# Patient Record
Sex: FEMALE | Race: WHITE | NOT HISPANIC OR LATINO | Employment: FULL TIME | URBAN - METROPOLITAN AREA
[De-identification: names, ages, dates, MRNs, and addresses within clinical notes are randomized per-mention and may not be internally consistent; named-entity substitution may affect disease eponyms.]

---

## 2017-10-23 ENCOUNTER — OFFICE VISIT (OUTPATIENT)
Dept: OBSTETRICS AND GYNECOLOGY | Facility: CLINIC | Age: 22
End: 2017-10-23
Payer: COMMERCIAL

## 2017-10-23 VITALS
SYSTOLIC BLOOD PRESSURE: 131 MMHG | WEIGHT: 123.44 LBS | BODY MASS INDEX: 22.58 KG/M2 | DIASTOLIC BLOOD PRESSURE: 87 MMHG

## 2017-10-23 DIAGNOSIS — Z12.4 ROUTINE CERVICAL SMEAR: ICD-10-CM

## 2017-10-23 DIAGNOSIS — Z11.3 SCREEN FOR STD (SEXUALLY TRANSMITTED DISEASE): ICD-10-CM

## 2017-10-23 DIAGNOSIS — Z01.419 WELL WOMAN EXAM WITH ROUTINE GYNECOLOGICAL EXAM: Primary | ICD-10-CM

## 2017-10-23 PROCEDURE — 87624 HPV HI-RISK TYP POOLED RSLT: CPT

## 2017-10-23 PROCEDURE — 88141 CYTOPATH C/V INTERPRET: CPT | Mod: ,,, | Performed by: PATHOLOGY

## 2017-10-23 PROCEDURE — 88175 CYTOPATH C/V AUTO FLUID REDO: CPT | Performed by: PATHOLOGY

## 2017-10-23 PROCEDURE — 87591 N.GONORRHOEAE DNA AMP PROB: CPT

## 2017-10-23 PROCEDURE — 99395 PREV VISIT EST AGE 18-39: CPT | Mod: S$GLB,,, | Performed by: OBSTETRICS & GYNECOLOGY

## 2017-10-23 PROCEDURE — 99999 PR PBB SHADOW E&M-EST. PATIENT-LVL III: CPT | Mod: PBBFAC,,, | Performed by: OBSTETRICS & GYNECOLOGY

## 2017-10-23 RX ORDER — NORGESTIMATE AND ETHINYL ESTRADIOL 0.25-0.035
1 KIT ORAL DAILY
Qty: 84 TABLET | Refills: 3 | Status: SHIPPED | OUTPATIENT
Start: 2017-10-23 | End: 2017-11-11 | Stop reason: SDUPTHER

## 2017-10-23 NOTE — PROGRESS NOTES
OBSTETRIC HISTORY:   OB History      Para Term  AB Living    0              SAB TAB Ectopic Multiple Live Births                      COMPREHENSIVE GYN HISTORY:  PAP History: Denies abnormal Paps. RECEIVED GARDASIL VACCINE  Infection History: Denies STDs. Denies PID.  Benign History: Denies uterine fibroids. Denies ovarian cysts. Denies endometriosis.   Cancer History: Denies cervical cancer. Denies uterine cancer or hyperplasia. Denies ovarian cancer. Denies vulvar cancer or pre-cancer. Denies vaginal cancer or pre-cancer. Denies breast cancer. Denies colon cancer.  Sexual Activity History:  reports that she currently engages in sexual activity. She reports using the following method of birth control/protection: OCP.   Menstrual History: Age of menarche: 13 years. Every 28 days, flows for 5 days. Moderate flow.  Dysmenorrhea History: Reports severe dysmenorrhea.       HPI:   22 y.o.  Patient's last menstrual period was 10/11/2017 (exact date).   Patient is  here for her annual gynecologic exam.  She has complaints of daily headaches and stomach cramping which started after generic Ortho Cyclen changed. Can print RX and try to find the actual Sprintec. Headache could be 2/2 borderline blood pressure so she should track that at home. Stomach cramps might be related to an inactive ingredient.. She denies bladder, breast and bowel complaints.    ROS:  GENERAL: Denies weight gain or weight loss. Feeling well overall.   SKIN: Denies rash or lesions.   HEAD: + headache.   NODES: Denies enlarged lymph nodes.   CHEST: Denies shortness of breath.   ABDOMEN: No abdominal pain (+ cramping) , constipation, diarrhea, nausea, vomiting or rectal bleeding.   URINARY: No frequency, dysuria, hematuria, or burning on urination.  REPRODUCTIVE: See HPI.   BREASTS: The patient denies pain, lumps, or nipple discharge.   HEMATOLOGIC: No easy bruisability.   MUSCULOSKELETAL: Denies joint pain or back pain.   NEUROLOGIC: Denies  weakness.   PSYCHIATRIC: Denies depression, anxiety or mood swings.    PE:   /87   Wt 56 kg (123 lb 7.3 oz)   LMP 10/11/2017 (Exact Date)   BMI 22.58 kg/m²   APPEARANCE: Well nourished, well developed, in no acute distress.  NECK: Neck symmetric without  thyromegaly.  NODES: No inguinal, cervical lymph node enlargement.  CHEST: Lungs clear to auscultation.  HEART: Regular rate and rhythm, no murmurs, rubs or gallops.  ABDOMEN: Soft. No tenderness or masses. No hernias.  BREASTS: Symmetrical, no skin changes or visible lesions. No palpable masses, nipple discharge or adenopathy bilaterally.  PELVIC:   VULVA: No lesions. Normal female genitalia.  URETHRAL MEATUS: Normal size and location, no lesions, no prolapse.  URETHRA: No masses, tenderness, prolapse or scarring.  VAGINA: Moist and well rugated, no discharge, no significant cystocele or rectocele.  CERVIX: No lesions and discharge.  UTERUS: Normal size, regular shape, mobile, non-tender, bladder base nontender.  ADNEXA: No masses or tenderness.    PROCEDURES:  Pap smear  GC/CT    Assessment:  Normal Gynecologic Exam  Headaches-- check BP at home  Cramping-- maybe GI related 2/2 inactive ingredient  Screen STD    Plan:  Mammogram and Colonoscopy if indicated by current recommendations.  Return to clinic in one year or for any problems or complaints.    Counseling:  Patient was counseled today on A.C.S. Pap guidelines and recommendations for yearly pelvic exams and monthly self breast exams; to see her PCP for other health maintenance. Regular exercise and healthy diet.

## 2017-10-24 LAB
C TRACH DNA SPEC QL NAA+PROBE: NOT DETECTED
N GONORRHOEA DNA SPEC QL NAA+PROBE: NOT DETECTED

## 2017-11-12 RX ORDER — NORGESTIMATE AND ETHINYL ESTRADIOL 0.25-0.035
KIT ORAL
Qty: 28 TABLET | Refills: 5 | Status: SHIPPED | OUTPATIENT
Start: 2017-11-12 | End: 2018-10-02 | Stop reason: SDUPTHER

## 2017-11-17 LAB
HPV16 AG SPEC QL: NEGATIVE
HPV16+18+H RISK 12 DNA CVX-IMP: POSITIVE
HPV18 DNA SPEC QL NAA+PROBE: NEGATIVE

## 2017-11-20 ENCOUNTER — TELEPHONE (OUTPATIENT)
Dept: OBSTETRICS AND GYNECOLOGY | Facility: CLINIC | Age: 22
End: 2017-11-20

## 2017-11-20 NOTE — TELEPHONE ENCOUNTER
Spoke with patient, Becky explained to patient what Ascus and HRHPV means. Patient verbalized understanding

## 2017-11-20 NOTE — TELEPHONE ENCOUNTER
LMTRC  Pap was ASCUS +HRHPV  It is recommned to repeat Cytology in one year even though she had the same pap last year per ASCCP guidelines

## 2017-11-20 NOTE — TELEPHONE ENCOUNTER
----- Message from Ashley Cosme sent at 11/20/2017 11:26 AM CST -----  Contact: Self [phone]  Patient Returning Your Phone Call

## 2018-10-01 ENCOUNTER — TELEPHONE (OUTPATIENT)
Dept: OBSTETRICS AND GYNECOLOGY | Facility: CLINIC | Age: 23
End: 2018-10-01

## 2018-10-01 NOTE — TELEPHONE ENCOUNTER
----- Message from Henny Carrillo sent at 10/1/2018  1:07 PM CDT -----  Contact: Self/ [phone]  Patient called in to get prescription refill on her birth control.     Please call and advise.     [id number] - [place], [place] - [address]

## 2018-10-01 NOTE — TELEPHONE ENCOUNTER
Patient is requesting a refill on OCP sent to her pharmacy. Patient has a future annual for 10/19/2018 at 10:45a. Please advise

## 2018-10-02 RX ORDER — NORGESTIMATE AND ETHINYL ESTRADIOL 0.25-0.035
1 KIT ORAL DAILY
Qty: 28 TABLET | Refills: 0 | Status: SHIPPED | OUTPATIENT
Start: 2018-10-02 | End: 2018-10-19 | Stop reason: SDUPTHER

## 2018-10-19 ENCOUNTER — OFFICE VISIT (OUTPATIENT)
Dept: OBSTETRICS AND GYNECOLOGY | Facility: CLINIC | Age: 23
End: 2018-10-19

## 2018-10-19 VITALS
BODY MASS INDEX: 22.07 KG/M2 | SYSTOLIC BLOOD PRESSURE: 120 MMHG | HEIGHT: 62 IN | WEIGHT: 119.94 LBS | DIASTOLIC BLOOD PRESSURE: 88 MMHG

## 2018-10-19 DIAGNOSIS — R87.810 ASCUS WITH POSITIVE HIGH RISK HPV CERVICAL: ICD-10-CM

## 2018-10-19 DIAGNOSIS — Z01.419 WELL WOMAN EXAM WITH ROUTINE GYNECOLOGICAL EXAM: Primary | ICD-10-CM

## 2018-10-19 DIAGNOSIS — Z11.3 SCREEN FOR STD (SEXUALLY TRANSMITTED DISEASE): ICD-10-CM

## 2018-10-19 DIAGNOSIS — Z12.4 ROUTINE CERVICAL SMEAR: ICD-10-CM

## 2018-10-19 DIAGNOSIS — Z00.00 LABORATORY EXAMINATION ORDERED AS PART OF A ROUTINE GENERAL MEDICAL EXAMINATION: ICD-10-CM

## 2018-10-19 DIAGNOSIS — R87.610 ASCUS WITH POSITIVE HIGH RISK HPV CERVICAL: ICD-10-CM

## 2018-10-19 PROCEDURE — 99999 PR PBB SHADOW E&M-EST. PATIENT-LVL III: CPT | Mod: PBBFAC,,, | Performed by: OBSTETRICS & GYNECOLOGY

## 2018-10-19 PROCEDURE — 99213 OFFICE O/P EST LOW 20 MIN: CPT | Mod: PBBFAC,PO | Performed by: OBSTETRICS & GYNECOLOGY

## 2018-10-19 PROCEDURE — 99395 PREV VISIT EST AGE 18-39: CPT | Mod: S$PBB,,, | Performed by: OBSTETRICS & GYNECOLOGY

## 2018-10-19 PROCEDURE — 88141 CYTOPATH C/V INTERPRET: CPT | Mod: ,,, | Performed by: PATHOLOGY

## 2018-10-19 PROCEDURE — 88175 CYTOPATH C/V AUTO FLUID REDO: CPT | Performed by: PATHOLOGY

## 2018-10-19 PROCEDURE — 87491 CHLMYD TRACH DNA AMP PROBE: CPT

## 2018-10-19 RX ORDER — NORGESTIMATE AND ETHINYL ESTRADIOL 0.25-0.035
1 KIT ORAL DAILY
Qty: 28 TABLET | Refills: 11 | Status: SHIPPED | OUTPATIENT
Start: 2018-10-19 | End: 2019-09-30 | Stop reason: SDUPTHER

## 2018-10-19 NOTE — PROGRESS NOTES
"OBSTETRIC HISTORY:   OB History      Para Term  AB Living    0              SAB TAB Ectopic Multiple Live Births                      COMPREHENSIVE GYN HISTORY:  PAP History: Reports abnormal Paps. Date:  Result: ASCUS +HRHPV Treatment: Repeat Pap  RECEIVED GARDASIL VACCINE  Infection History: Denies STDs. Denies PID.  Benign History: Denies uterine fibroids. Denies ovarian cysts. Denies endometriosis.   Cancer History: Denies cervical cancer. Denies uterine cancer or hyperplasia. Denies ovarian cancer. Denies vulvar cancer or pre-cancer. Denies vaginal cancer or pre-cancer. Denies breast cancer. Denies colon cancer.  Sexual Activity History:  reports that she currently engages in sexual activity. She reports using the following method of birth control/protection: OCP.   Menstrual History: Age of menarche: 13 years. Every 28 days, flows for 5 days. Moderate flow.  Dysmenorrhea History: Reports severe dysmenorrhea.         HPI:   23 y.o.  Patient's last menstrual period was 10/10/2018.   Patient is  here for her annual gynecologic exam.  She has complaints pain with sex. She denies bladder, breast and bowel complaints.    ROS:  GENERAL: Denies weight gain or weight loss. Feeling well overall.   SKIN: Denies rash or lesions.   HEAD: Denies headache.   NODES: Denies enlarged lymph nodes.   CHEST: Denies shortness of breath.   ABDOMEN: No abdominal pain, constipation, diarrhea, nausea, vomiting or rectal bleeding.   URINARY: No frequency, dysuria, hematuria, or burning on urination.  REPRODUCTIVE: See HPI.   BREASTS: The patient denies pain, lumps, or nipple discharge.   HEMATOLOGIC: No easy bruisability.   MUSCULOSKELETAL: Denies joint pain or back pain.   NEUROLOGIC: Denies weakness.   PSYCHIATRIC: Denies depression, anxiety or mood swings.    PE:   /88   Ht 5' 2" (1.575 m)   Wt 54.4 kg (119 lb 14.9 oz)   LMP 10/10/2018   BMI 21.94 kg/m²   APPEARANCE: Well nourished, well developed, in no " acute distress.  NECK: Neck symmetric without  thyromegaly.  NODES: No inguinal, cervical lymph node enlargement.  CHEST: Lungs clear to auscultation.  HEART: Regular rate and rhythm, no murmurs, rubs or gallops.  ABDOMEN: Soft. No tenderness or masses. No hernias. Asymmetric pelvic crests  BREASTS: Symmetrical, no skin changes or visible lesions. No palpable masses, nipple discharge or adenopathy bilaterally.  PELVIC:   VULVA: No lesions. Normal female genitalia.  URETHRAL MEATUS: Normal size and location, no lesions, no prolapse.  URETHRA: No masses, tenderness, prolapse or scarring.  VAGINA: Moist and well rugated, no discharge, no significant cystocele or rectocele.  CERVIX: No lesions and discharge.  UTERUS: Normal size, regular shape, mobile, non-tender, bladder base nontender.  ADNEXA: No masses or tenderness.+ pelvic floor tenderness    PROCEDURES:  Pap smear  GC/CT    Assessment:  Normal Gynecologic Exam  SI and PFD  Screen STD    Plan:  Mammogram and Colonoscopy if indicated by current recommendations.  Return to clinic in one year or for any problems or complaints.    Counseling:  Patient was counseled today on A.C.S. Pap guidelines and recommendations for yearly pelvic exams and monthly self breast exams; to see her PCP for other health maintenance. Regular exercise and healthy diet.   Patient counseled on SI joint and pelvic floor pain. Given article to read and recommend PT, massage therapy, Chiropractor, and/or inserts from [employer]. Increase exercises that help with the core. Taught Kegel relaxation technique.

## 2018-10-20 LAB
C TRACH DNA SPEC QL NAA+PROBE: NOT DETECTED
N GONORRHOEA DNA SPEC QL NAA+PROBE: NOT DETECTED

## 2018-10-22 ENCOUNTER — TELEPHONE (OUTPATIENT)
Dept: OBSTETRICS AND GYNECOLOGY | Facility: CLINIC | Age: 23
End: 2018-10-22

## 2019-02-14 ENCOUNTER — HOSPITAL ENCOUNTER (EMERGENCY)
Facility: HOSPITAL | Age: 24
Discharge: HOME | End: 2019-02-14
Attending: EMERGENCY MEDICINE

## 2019-02-14 VITALS
DIASTOLIC BLOOD PRESSURE: 68 MMHG | BODY MASS INDEX: 21.53 KG/M2 | TEMPERATURE: 99 F | HEART RATE: 54 BPM | WEIGHT: 117 LBS | OXYGEN SATURATION: 99 % | SYSTOLIC BLOOD PRESSURE: 103 MMHG | RESPIRATION RATE: 17 BRPM | HEIGHT: 62 IN

## 2019-02-14 DIAGNOSIS — R51.9 ACUTE NONINTRACTABLE HEADACHE, UNSPECIFIED HEADACHE TYPE: ICD-10-CM

## 2019-02-14 DIAGNOSIS — R10.84 GENERALIZED ABDOMINAL PAIN: ICD-10-CM

## 2019-02-14 DIAGNOSIS — R11.2 NON-INTRACTABLE VOMITING WITH NAUSEA, UNSPECIFIED VOMITING TYPE: ICD-10-CM

## 2019-02-14 DIAGNOSIS — R10.9 ABDOMINAL PAIN: Primary | ICD-10-CM

## 2019-02-14 DIAGNOSIS — M62.838 MUSCLE SPASMS OF NECK: ICD-10-CM

## 2019-02-14 LAB
ALBUMIN SERPL BCP-MCNC: 4.1 G/DL
ALP SERPL-CCNC: 51 U/L
ALT SERPL W/O P-5'-P-CCNC: 17 U/L
AMPHET+METHAMPHET UR QL: NEGATIVE
ANION GAP SERPL CALC-SCNC: 10 MMOL/L
AST SERPL-CCNC: 19 U/L
B-HCG UR QL: NEGATIVE
BARBITURATES UR QL SCN>200 NG/ML: NEGATIVE
BASOPHILS # BLD AUTO: 0.01 K/UL
BASOPHILS NFR BLD: 0.1 %
BENZODIAZ UR QL SCN>200 NG/ML: NEGATIVE
BILIRUB SERPL-MCNC: 0.7 MG/DL
BILIRUB UR QL STRIP: NEGATIVE
BUN SERPL-MCNC: 12 MG/DL
BZE UR QL SCN: NEGATIVE
CALCIUM SERPL-MCNC: 9.3 MG/DL
CANNABINOIDS UR QL SCN: NORMAL
CHLORIDE SERPL-SCNC: 105 MMOL/L
CK SERPL-CCNC: 71 U/L
CLARITY UR: CLEAR
CO2 SERPL-SCNC: 23 MMOL/L
COLOR UR: YELLOW
CREAT SERPL-MCNC: 0.8 MG/DL
CREAT UR-MCNC: 49.8 MG/DL
CTP QC/QA: YES
DIFFERENTIAL METHOD: ABNORMAL
EOSINOPHIL # BLD AUTO: 0.1 K/UL
EOSINOPHIL NFR BLD: 0.9 %
ERYTHROCYTE [DISTWIDTH] IN BLOOD BY AUTOMATED COUNT: 12.9 %
EST. GFR  (AFRICAN AMERICAN): >60 ML/MIN/1.73 M^2
EST. GFR  (NON AFRICAN AMERICAN): >60 ML/MIN/1.73 M^2
GLUCOSE SERPL-MCNC: 83 MG/DL
GLUCOSE UR QL STRIP: NEGATIVE
HCG INTACT+B SERPL-ACNC: <1.2 MIU/ML
HCT VFR BLD AUTO: 37.6 %
HGB BLD-MCNC: 12.2 G/DL
HGB UR QL STRIP: NEGATIVE
KETONES UR QL STRIP: NEGATIVE
LEUKOCYTE ESTERASE UR QL STRIP: NEGATIVE
LYMPHOCYTES # BLD AUTO: 1.6 K/UL
LYMPHOCYTES NFR BLD: 21.5 %
MCH RBC QN AUTO: 25.7 PG
MCHC RBC AUTO-ENTMCNC: 32.4 G/DL
MCV RBC AUTO: 79 FL
METHADONE UR QL SCN>300 NG/ML: NEGATIVE
MONOCYTES # BLD AUTO: 0.3 K/UL
MONOCYTES NFR BLD: 3.7 %
NEUTROPHILS # BLD AUTO: 5.4 K/UL
NEUTROPHILS NFR BLD: 73.7 %
NITRITE UR QL STRIP: NEGATIVE
OPIATES UR QL SCN: NEGATIVE
PCP UR QL SCN>25 NG/ML: NEGATIVE
PH UR STRIP: 7 [PH] (ref 5–8)
PLATELET # BLD AUTO: 216 K/UL
PMV BLD AUTO: 9.8 FL
POTASSIUM SERPL-SCNC: 3.5 MMOL/L
PROT SERPL-MCNC: 7.7 G/DL
PROT UR QL STRIP: NEGATIVE
RBC # BLD AUTO: 4.75 M/UL
SODIUM SERPL-SCNC: 138 MMOL/L
SP GR UR STRIP: 1.01 (ref 1–1.03)
TOXICOLOGY INFORMATION: NORMAL
URN SPEC COLLECT METH UR: NORMAL
UROBILINOGEN UR STRIP-ACNC: NEGATIVE EU/DL
WBC # BLD AUTO: 7.38 K/UL

## 2019-02-14 PROCEDURE — 81025 URINE PREGNANCY TEST: CPT | Performed by: EMERGENCY MEDICINE

## 2019-02-14 PROCEDURE — 81003 URINALYSIS AUTO W/O SCOPE: CPT

## 2019-02-14 PROCEDURE — 96361 HYDRATE IV INFUSION ADD-ON: CPT

## 2019-02-14 PROCEDURE — 25000003 PHARM REV CODE 250: Performed by: EMERGENCY MEDICINE

## 2019-02-14 PROCEDURE — 85025 COMPLETE CBC W/AUTO DIFF WBC: CPT

## 2019-02-14 PROCEDURE — 63600175 PHARM REV CODE 636 W HCPCS: Performed by: EMERGENCY MEDICINE

## 2019-02-14 PROCEDURE — 99284 EMERGENCY DEPT VISIT MOD MDM: CPT | Mod: 25

## 2019-02-14 PROCEDURE — 82550 ASSAY OF CK (CPK): CPT

## 2019-02-14 PROCEDURE — 80053 COMPREHEN METABOLIC PANEL: CPT

## 2019-02-14 PROCEDURE — 84702 CHORIONIC GONADOTROPIN TEST: CPT

## 2019-02-14 PROCEDURE — 96374 THER/PROPH/DIAG INJ IV PUSH: CPT

## 2019-02-14 PROCEDURE — 96375 TX/PRO/DX INJ NEW DRUG ADDON: CPT

## 2019-02-14 PROCEDURE — 80307 DRUG TEST PRSMV CHEM ANLYZR: CPT

## 2019-02-14 RX ORDER — CYCLOBENZAPRINE HCL 10 MG
10 TABLET ORAL EVERY 8 HOURS PRN
Qty: 14 TABLET | Refills: 0 | Status: SHIPPED | OUTPATIENT
Start: 2019-02-14 | End: 2019-02-19

## 2019-02-14 RX ORDER — ONDANSETRON 4 MG/1
4 TABLET, FILM COATED ORAL EVERY 6 HOURS PRN
Qty: 10 TABLET | Refills: 0 | Status: SHIPPED | OUTPATIENT
Start: 2019-02-14 | End: 2020-01-07 | Stop reason: CLARIF

## 2019-02-14 RX ORDER — DICYCLOMINE HYDROCHLORIDE 20 MG/1
20 TABLET ORAL 3 TIMES DAILY PRN
Qty: 14 TABLET | Refills: 0 | Status: SHIPPED | OUTPATIENT
Start: 2019-02-14 | End: 2019-03-16

## 2019-02-14 RX ORDER — METOCLOPRAMIDE HYDROCHLORIDE 5 MG/ML
10 INJECTION INTRAMUSCULAR; INTRAVENOUS
Status: COMPLETED | OUTPATIENT
Start: 2019-02-14 | End: 2019-02-14

## 2019-02-14 RX ORDER — KETOROLAC TROMETHAMINE 30 MG/ML
15 INJECTION, SOLUTION INTRAMUSCULAR; INTRAVENOUS
Status: COMPLETED | OUTPATIENT
Start: 2019-02-14 | End: 2019-02-14

## 2019-02-14 RX ORDER — BUTALBITAL, ACETAMINOPHEN AND CAFFEINE 50; 325; 40 MG/1; MG/1; MG/1
1 TABLET ORAL EVERY 6 HOURS PRN
Qty: 14 TABLET | Refills: 0 | Status: SHIPPED | OUTPATIENT
Start: 2019-02-14 | End: 2019-03-16

## 2019-02-14 RX ADMIN — KETOROLAC TROMETHAMINE 15 MG: 30 INJECTION, SOLUTION INTRAMUSCULAR at 03:02

## 2019-02-14 RX ADMIN — METOCLOPRAMIDE 10 MG: 5 INJECTION, SOLUTION INTRAMUSCULAR; INTRAVENOUS at 03:02

## 2019-02-14 RX ADMIN — LIDOCAINE HYDROCHLORIDE: 20 SOLUTION ORAL; TOPICAL at 03:02

## 2019-02-14 RX ADMIN — SODIUM CHLORIDE 1000 ML: 0.9 INJECTION, SOLUTION INTRAVENOUS at 01:02

## 2019-02-14 NOTE — ED NOTES
Pt with multiple complaints including N/V, fatigue intermittently for the past month, Missed last menstral cycle, had negative pregnancy tests at home, Stiff neck x 2 days and headache rated 2/10.

## 2019-02-14 NOTE — ED PROVIDER NOTES
Encounter Date: 2/14/2019       History     Chief Complaint   Patient presents with    Emesis     N/V x1 month, fatigue, reports -UPT at home, missed period. Also complaining of R neck pain and swelling with headache but has improved.      24-year-old female presents emergency department complaining of nausea, vomiting. Onset a few weeks ago.  States initially she had some nausea, vomiting, diarrhea, with a few episodes of nonbloody, nonbilious emesis per day as well as 1 or 2 loose nonbloody bowel movements.  She had some abdominal cramping.  She thought she had a stomach bug.  Though symptoms mostly resolved, except for the nausea.  States she has been having persistent nausea with intermittent emesis, sometimes none and sometimes 2 or 3 episodes per day.  The symptoms have been persistent over the past few weeks.  She also states he she missed her last menstrual cycle, although she reports she has had 2 or 3-urine pregnancy tests.  She also reports some breast tenderness bilaterally and general fatigue. She also complains of some right lateral neck pain and swelling.  Reports it was worse last night and is improved somewhat, but she still has a spasm and aching like pain to her neck.  Last night also caused her to have a headache, which has since resolved.  Pain is worse with certain movements and positions and without alleviating factors.  No recent injury. Denies any focal numbness or weakness. No other symptoms reported at this time.          Review of patient's allergies indicates:  No Known Allergies  Past Medical History:   Diagnosis Date    Asthma     Seizures      History reviewed. No pertinent surgical history.  Family History   Problem Relation Age of Onset    Breast cancer Paternal Grandmother 75    Heart disease Maternal Grandfather     Colon cancer Neg Hx     Ovarian cancer Neg Hx      Social History     Tobacco Use    Smoking status: Former Smoker    Smokeless tobacco: Never Used    Substance Use Topics    Alcohol use: Yes     Comment: occasional     Drug use: No     Review of Systems   Constitutional: Positive for fatigue. Negative for chills and fever.   HENT: Negative for congestion, sore throat and voice change.    Eyes: Negative for photophobia, pain and redness.   Respiratory: Negative for cough, choking and shortness of breath.    Cardiovascular: Negative for chest pain, palpitations and leg swelling.   Gastrointestinal: Positive for abdominal pain, nausea and vomiting.   Genitourinary: Negative for dysuria, frequency and urgency.   Musculoskeletal: Positive for neck pain. Negative for back pain and neck stiffness.   Neurological: Positive for headaches. Negative for seizures, syncope, speech difficulty, light-headedness and numbness.   All other systems reviewed and are negative.      Physical Exam     Initial Vitals [02/14/19 1131]   BP Pulse Resp Temp SpO2   (!) 141/103 98 17 98.6 °F (37 °C) 98 %      MAP       --         Physical Exam    Nursing note and vitals reviewed.  Constitutional: She appears well-developed and well-nourished. No distress.   HENT:   Head: Normocephalic and atraumatic.   Oropharynx clear; Dry MM   Eyes: Conjunctivae and EOM are normal. Pupils are equal, round, and reactive to light.   Neck: Normal range of motion. Neck supple. No tracheal deviation present.       Cardiovascular: Normal rate, regular rhythm, normal heart sounds and intact distal pulses.   Pulmonary/Chest: Breath sounds normal. No respiratory distress. She has no wheezes. She has no rhonchi. She has no rales.   Abdominal: Soft. Bowel sounds are normal. She exhibits no distension. There is no tenderness.   Musculoskeletal: Normal range of motion. She exhibits no edema or tenderness.   Neurological: She is alert and oriented to person, place, and time. She has normal strength. No cranial nerve deficit or sensory deficit. GCS score is 15. GCS eye subscore is 4. GCS verbal subscore is 5. GCS  motor subscore is 6.   Skin: Skin is warm and dry. Capillary refill takes less than 2 seconds.         ED Course   Procedures  Labs Reviewed   URINALYSIS   DRUG SCREEN PANEL, URINE EMERGENCY   CBC W/ AUTO DIFFERENTIAL   COMPREHENSIVE METABOLIC PANEL   CK   HCG, QUANTITATIVE, PREGNANCY   POCT URINE PREGNANCY            X-Rays:   Independently Interpreted Readings:   Other Readings:  Imaging interpreted by radiologist and visualized by me:    Imaging Results          X-Ray Abdomen Flat And Erect (Final result)  Result time 02/14/19 14:47:04    Final result by Skyler Servin MD (02/14/19 14:47:04)                 Impression:      No acute process.      Electronically signed by: Skyler Servin MD  Date:    02/14/2019  Time:    14:47             Narrative:    EXAMINATION:  XR ABDOMEN FLAT AND ERECT    CLINICAL HISTORY:  Unspecified abdominal pain    TECHNIQUE:  Flat and erect AP views of the abdomen were performed.    COMPARISON:  None    FINDINGS:  No dilated loops of small bowel.  Mild amount of gas within the colon.  No free air under the diaphragm.  Lung bases are clear.  Osseous structures are within normal limits.                                Medical Decision Making:   Initial Assessment:   24-year-old female presentsEmergency department complaining of nausea, vomiting, diarrhea, abdominal pain, missed last menstrual cycle, muscle spasm, headache  Differential Diagnosis:   ICH, SAH, CVA, tension versus cluster versus migraine headache, muscle spasm, fracture, dislocation, contusion, sprain, strain, dehydration, electrolyte dyscrasias, diverticulitis, cholecystitis, pancreatitis, appendicitis, obstruction, constipation, UTI, pyelonephritis, kidney stone, pregnancy, miscarriage, ectopic.  Independently Interpreted Test(s):   I have ordered and independently interpreted X-rays - see prior notes.  Clinical Tests:   Lab Tests: Reviewed       <> Summary of Lab: Benign  ED Management:  Labs and imaging benign.   Patient given IV fluid, Reglan, Toradol, GI cocktail.  She is tolerating p.o. and reports almost total resolution of all of her symptoms.  Vital signs remained stable.  Informed of results as well as plan to discharge with prescriptions for Zofran, Bentyl, Fioricet, Flexeril, instructions to follow up with both her primary care and OB gyn physicians, reasons to return to the emergency room.  Patient expressed good understanding and is comfortable with discharge at this time.                   ED Course as of Feb 14 1346   Thu Feb 14, 2019   1144 This is a SORT/MSE of a 24 y.o. female presenting to the ED with c/o N/V x 1 month. Patient reports that she missed her period but reports negative UPT at home. Care will be transferred to an alternate provider when patient is roomed for a full evaluation and final disposition. KAREN BrushP-BC 11:44 AM   [CH]      ED Course User Index  [CH] John Lovell NP     Clinical Impression:   The primary encounter diagnosis was Abdominal pain. Diagnoses of Non-intractable vomiting with nausea, unspecified vomiting type, Acute nonintractable headache, unspecified headache type, Muscle spasms of neck, and Generalized abdominal pain were also pertinent to this visit.      Disposition:   Disposition: Discharged  Condition: Stable                        Owen House MD  02/14/19 1605       Owen House MD  03/01/19 7420

## 2019-09-30 RX ORDER — NORGESTIMATE AND ETHINYL ESTRADIOL 0.25-0.035
1 KIT ORAL DAILY
Qty: 28 TABLET | Refills: 0 | Status: SHIPPED | OUTPATIENT
Start: 2019-09-30 | End: 2019-10-17 | Stop reason: SDUPTHER

## 2019-09-30 NOTE — TELEPHONE ENCOUNTER
Pap negative at last well woman exam on 10/19/2018.  Annual scheduled 10/17/2019.  Patient notified one pack will be sent to the [facility]. Once seen on 10/17/19 a new prescription for the year will be sent.  Patient verbalized understanding

## 2019-09-30 NOTE — TELEPHONE ENCOUNTER
----- Message from Courtney Jarrett sent at 9/30/2019  1:04 PM CDT -----  Contact: self  [phone]  Pt is calling to request a refill for norgestimate-ethinyl estradiol (SPRINTEC, 28, ORAL)      [facility] [address] phone [phone]      Thank you!

## 2019-10-17 ENCOUNTER — OFFICE VISIT (OUTPATIENT)
Dept: OBSTETRICS AND GYNECOLOGY | Facility: CLINIC | Age: 24
End: 2019-10-17
Payer: MEDICAID

## 2019-10-17 VITALS
BODY MASS INDEX: 20.59 KG/M2 | DIASTOLIC BLOOD PRESSURE: 80 MMHG | HEIGHT: 62 IN | SYSTOLIC BLOOD PRESSURE: 124 MMHG | WEIGHT: 111.88 LBS

## 2019-10-17 DIAGNOSIS — Z01.419 WELL WOMAN EXAM WITH ROUTINE GYNECOLOGICAL EXAM: Primary | ICD-10-CM

## 2019-10-17 DIAGNOSIS — Z12.4 ROUTINE CERVICAL SMEAR: ICD-10-CM

## 2019-10-17 PROCEDURE — 99213 OFFICE O/P EST LOW 20 MIN: CPT | Mod: PBBFAC,PO | Performed by: OBSTETRICS & GYNECOLOGY

## 2019-10-17 PROCEDURE — 99395 PR PREVENTIVE VISIT,EST,18-39: ICD-10-PCS | Mod: S$PBB,,, | Performed by: OBSTETRICS & GYNECOLOGY

## 2019-10-17 PROCEDURE — 99395 PREV VISIT EST AGE 18-39: CPT | Mod: S$PBB,,, | Performed by: OBSTETRICS & GYNECOLOGY

## 2019-10-17 PROCEDURE — 99999 PR PBB SHADOW E&M-EST. PATIENT-LVL III: CPT | Mod: PBBFAC,,, | Performed by: OBSTETRICS & GYNECOLOGY

## 2019-10-17 PROCEDURE — 88175 CYTOPATH C/V AUTO FLUID REDO: CPT

## 2019-10-17 PROCEDURE — 87491 CHLMYD TRACH DNA AMP PROBE: CPT

## 2019-10-17 PROCEDURE — 99999 PR PBB SHADOW E&M-EST. PATIENT-LVL III: ICD-10-PCS | Mod: PBBFAC,,, | Performed by: OBSTETRICS & GYNECOLOGY

## 2019-10-17 RX ORDER — NORGESTIMATE AND ETHINYL ESTRADIOL 0.25-0.035
1 KIT ORAL DAILY
Qty: 28 TABLET | Refills: 11 | Status: SHIPPED | OUTPATIENT
Start: 2019-10-17 | End: 2020-10-16 | Stop reason: SDUPTHER

## 2019-10-17 NOTE — PROGRESS NOTES
"OBSTETRIC HISTORY:   OB History        0    Para        Term                AB        Living           SAB        TAB        Ectopic        Multiple        Live Births                    COMPREHENSIVE GYN HISTORY:  PAP History: Reports abnormal Paps. Date:  Result: ASCUS +HRHPV Treatment: Repeat Pap  RECEIVED GARDASIL VACCINE  Infection History: Denies STDs. Denies PID.  Benign History: Denies uterine fibroids. Denies ovarian cysts. Denies endometriosis.   Cancer History: Denies cervical cancer. Denies uterine cancer or hyperplasia. Denies ovarian cancer. Denies vulvar cancer or pre-cancer. Denies vaginal cancer or pre-cancer. Denies breast cancer. Denies colon cancer.  Sexual Activity History:  reports that she currently engages in sexual activity. She reports using the following method of birth control/protection: OCP.   Menstrual History: Age of menarche: 13 years. Every 28 days, flows for 5 days. Moderate flow.  Dysmenorrhea History: Reports severe dysmenorrhea.         HPI:   24 y.o.  Patient's last menstrual period was 10/08/2019.   Patient is  here for her annual gynecologic exam.  She has no complaints. She denies bladder, breast and bowel complaints.    ROS:  GENERAL: Denies weight gain or weight loss. Feeling well overall.   SKIN: Denies rash or lesions.   HEAD: Denies headache.   NODES: Denies enlarged lymph nodes.   CHEST: Denies shortness of breath.   ABDOMEN: No abdominal pain, constipation, diarrhea, nausea, vomiting or rectal bleeding.   URINARY: No frequency, dysuria, hematuria, or burning on urination.  REPRODUCTIVE: See HPI.   BREASTS: The patient denies pain, lumps, or nipple discharge.   HEMATOLOGIC: No easy bruisability.   MUSCULOSKELETAL: Denies joint pain or back pain.   NEUROLOGIC: Denies weakness.   PSYCHIATRIC: Denies depression, anxiety or mood swings.    PE:   /80   Ht 5' 2" (1.575 m)   Wt 50.8 kg (111 lb 14.1 oz)   LMP 10/08/2019   BMI 20.46 kg/m² "   APPEARANCE: Well nourished, well developed, in no acute distress.  NECK: Neck symmetric without  thyromegaly.  NODES: No inguinal, cervical lymph node enlargement.  CHEST: Lungs clear to auscultation.  HEART: Regular rate and rhythm, no murmurs, rubs or gallops.  ABDOMEN: Soft. No tenderness or masses. No hernias.  BREASTS: Symmetrical, no skin changes or visible lesions. No palpable masses, nipple discharge or adenopathy bilaterally.  PELVIC:   VULVA: No lesions. Normal female genitalia.  URETHRAL MEATUS: Normal size and location, no lesions, no prolapse.  URETHRA: No masses, tenderness, prolapse or scarring.  VAGINA: Moist and well rugated, no discharge, no significant cystocele or rectocele.  CERVIX: No lesions and discharge.  UTERUS: Normal size, regular shape, mobile, non-tender, bladder base nontender.  ADNEXA: No masses or tenderness.    PROCEDURES:  Pap smear  GC/CT    Assessment:  Normal Gynecologic Exam    Plan:  Mammogram and Colonoscopy if indicated by current recommendations.  Return to clinic in one year or for any problems or complaints.    Counseling:  Patient was counseled today on A.C.S. Pap guidelines and recommendations for yearly pelvic exams and monthly self breast exams; to see her PCP for other health maintenance. Regular exercise and healthy diet.

## 2019-10-18 LAB
C TRACH DNA SPEC QL NAA+PROBE: NOT DETECTED
N GONORRHOEA DNA SPEC QL NAA+PROBE: NOT DETECTED

## 2020-01-07 ENCOUNTER — HOSPITAL ENCOUNTER (EMERGENCY)
Facility: HOSPITAL | Age: 25
Discharge: HOME | End: 2020-01-07
Attending: EMERGENCY MEDICINE
Payer: MEDICAID

## 2020-01-07 VITALS
SYSTOLIC BLOOD PRESSURE: 126 MMHG | BODY MASS INDEX: 21.16 KG/M2 | WEIGHT: 115 LBS | DIASTOLIC BLOOD PRESSURE: 82 MMHG | HEIGHT: 62 IN | HEART RATE: 65 BPM | TEMPERATURE: 99 F | RESPIRATION RATE: 18 BRPM | OXYGEN SATURATION: 100 %
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
## 2020-01-07 DIAGNOSIS — S93.402A MODERATE LEFT ANKLE SPRAIN, INITIAL ENCOUNTER: Primary | ICD-10-CM

## 2020-01-07 LAB
B-HCG UR QL: NEGATIVE
CTP QC/QA: YES

## 2020-01-07 PROCEDURE — 25000003 PHARM REV CODE 250: Performed by: NURSE PRACTITIONER

## 2020-01-07 PROCEDURE — 99284 EMERGENCY DEPT VISIT MOD MDM: CPT | Mod: 25

## 2020-01-07 PROCEDURE — 81025 URINE PREGNANCY TEST: CPT | Performed by: EMERGENCY MEDICINE

## 2020-01-07 RX ORDER — IBUPROFEN 600 MG/1
600 TABLET ORAL EVERY 6 HOURS PRN
Qty: 20 TABLET | Refills: 0 | Status: SHIPPED | OUTPATIENT
Start: 2020-01-07 | End: 2020-01-12

## 2020-01-07 RX ORDER — IBUPROFEN 600 MG/1
600 TABLET ORAL
Status: COMPLETED | OUTPATIENT
Start: 2020-01-07 | End: 2020-01-07

## 2020-01-07 RX ORDER — RIZATRIPTAN BENZOATE 10 MG/1
10 TABLET ORAL
COMMUNITY
End: 2024-02-19

## 2020-01-07 RX ORDER — BUSPIRONE HYDROCHLORIDE 10 MG/1
10 TABLET ORAL 3 TIMES DAILY
COMMUNITY
End: 2020-12-02

## 2020-01-07 RX ORDER — FLUTICASONE PROPIONATE 50 MCG
1 SPRAY, SUSPENSION (ML) NASAL DAILY
COMMUNITY

## 2020-01-07 RX ORDER — METHOCARBAMOL 750 MG/1
750 TABLET, FILM COATED ORAL 3 TIMES DAILY PRN
Qty: 15 TABLET | Refills: 0 | Status: SHIPPED | OUTPATIENT
Start: 2020-01-07 | End: 2020-01-12

## 2020-01-07 RX ORDER — AMITRIPTYLINE HYDROCHLORIDE 10 MG/1
25 TABLET, FILM COATED ORAL NIGHTLY PRN
COMMUNITY
End: 2020-07-09

## 2020-01-07 RX ADMIN — IBUPROFEN 600 MG: 600 TABLET, FILM COATED ORAL at 10:01

## 2020-01-07 NOTE — ED PROVIDER NOTES
Encounter Date: 1/7/2020       History     Chief Complaint   Patient presents with    Ankle Pain     right ankle pain after a fall 1 day ago     Pt is a 23 yo female with medical history of anxiety, seizures, asthma and migraines presenting to the ED for left ankle and foot pain after a trip and fall last night.  Pt states fall occurred around 9pm last night.  Pt denies taking anything for pain.  Pt states she tried ice and elevation for swelling.  Pt states pain worse this morning.  Pt states she is unable to apply pressure since fall.  Pt denies any decreased sensation, numbness or tingling of foot.      The history is provided by the patient and a parent.     Review of patient's allergies indicates:  No Known Allergies  Past Medical History:   Diagnosis Date    Anxiety     Asthma     Migraine     Seizures      No past surgical history on file.  Family History   Problem Relation Age of Onset    Breast cancer Paternal Grandmother 75    Heart disease Maternal Grandfather     Colon cancer Neg Hx     Ovarian cancer Neg Hx      Social History     Tobacco Use    Smoking status: Former Smoker    Smokeless tobacco: Never Used   Substance Use Topics    Alcohol use: Yes     Comment: occasional     Drug use: No     Review of Systems   Constitutional: Positive for activity change.   Respiratory: Negative for shortness of breath.    Cardiovascular: Negative for chest pain.   Gastrointestinal: Negative for nausea.   Genitourinary: Negative for dysuria.   Musculoskeletal: Positive for arthralgias ( left ankle/foot), gait problem ( due to pain) and joint swelling ( left ankle). Negative for back pain.   Skin: Positive for color change ( left ankle). Negative for rash and wound.   Neurological: Negative for dizziness, seizures, weakness and numbness.   Hematological: Does not bruise/bleed easily.   All other systems reviewed and are negative.      Physical Exam     Initial Vitals [01/07/20 0952]   BP Pulse Resp Temp  SpO2   (!) 156/78 95 18 98.6 °F (37 °C) 100 %      MAP       --         Physical Exam    Nursing note and vitals reviewed.  Constitutional: Vital signs are normal. She appears well-developed and well-nourished. She is cooperative. No distress.   HENT:   Head: Normocephalic and atraumatic.   Mouth/Throat: Uvula is midline, oropharynx is clear and moist and mucous membranes are normal.   Eyes: Conjunctivae, EOM and lids are normal. Pupils are equal, round, and reactive to light.   Neck: Trachea normal, normal range of motion and phonation normal. Neck supple.   Cardiovascular: Normal rate, regular rhythm and intact distal pulses.   Pulses:       Radial pulses are 2+ on the right side, and 2+ on the left side.        Dorsalis pedis pulses are 2+ on the right side, and 2+ on the left side.   Pulmonary/Chest: Effort normal and breath sounds normal.   Abdominal: Normal appearance.   Musculoskeletal:        Left ankle: She exhibits decreased range of motion, swelling and ecchymosis. She exhibits no deformity, no laceration and normal pulse. Tenderness.        Left foot: There is decreased range of motion, tenderness, bony tenderness and swelling. There is normal capillary refill, no crepitus, no deformity and no laceration.   Neurological: She is alert and oriented to person, place, and time. She has normal strength. No sensory deficit. GCS eye subscore is 4. GCS verbal subscore is 5. GCS motor subscore is 6.   Skin: Skin is warm, dry and intact. Capillary refill takes less than 2 seconds. Bruising and ecchymosis ( left ankle) noted. No pallor.         ED Course   Procedures  Labs Reviewed   POCT URINE PREGNANCY          Imaging Results          X-Ray Foot Complete Left (Final result)  Result time 01/07/20 11:47:18    Final result by Alexander Chaudhary MD (01/07/20 11:47:18)                 Impression:      As above.      Electronically signed by: Alexander Chaudhary  Date:    01/07/2020  Time:    11:47              Narrative:    EXAMINATION:  XR FOOT COMPLETE 3 VIEW LEFT    CLINICAL HISTORY:  .  Unspecified fall, initial encounter    TECHNIQUE:  AP, lateral and oblique views of the left foot were performed.    COMPARISON:  None available    FINDINGS:  No acute fracture or dislocation.  No appreciable Lisfranc interval widening allowing for technique.  No radiopaque foreign body.                               X-Ray Ankle Complete Left (Final result)  Result time 01/07/20 11:46:18    Final result by Alexander Chaudhary MD (01/07/20 11:46:18)                 Impression:      As above.      Electronically signed by: Alexander Chaudhary  Date:    01/07/2020  Time:    11:46             Narrative:    EXAMINATION:  XR ANKLE COMPLETE 3 VIEW LEFT    CLINICAL HISTORY:  Unspecified fall, initial encounter    TECHNIQUE:  AP, lateral and oblique views of the left ankle were performed.    COMPARISON:  None available    FINDINGS:  No acute fracture or dislocation.  Ankle mortise is symmetric.  No radiopaque foreign body.                              X-Rays:   Independently Interpreted Readings:   Other Readings:  Left foot and ankle x-ray showed negative for any acute fractures or dislocations.  No Lisfranc interval widening noted. Ankle mortise symmetric.     Medical Decision Making:   Initial Assessment:   Emergent evaluation of a 25 yo female patient presenting to the ER with chief complaint of left ankle and foot pain after a trip and fall last night.  Patient states she tried ice and elevation but pain was worse this morning.  Patient states she is unable ambulate due to pain. On exam patient is A&O x3.  Cap refill less than 3 sec.  Tenderness to palpation to left dorsal aspect of ankle.  Strength 4/5 due to pain. Good sensation noted.  Plus two DP noted. Ecchymosis noted to dorsal ankle and foot.  No calf or shin pain to palpation noted.  Differential Diagnosis:   Differential diagnoses include but are not limited to sprain, strain,  fracture, contusion, dislocation, others.      Independently Interpreted Test(s):   I have ordered and independently interpreted X-rays - see prior notes.  Clinical Tests:   Lab Tests: Ordered and Reviewed  The following lab test(s) were unremarkable: UPT       <> Summary of Lab: Urine pregnancy negative.   Radiological Study: Ordered and Reviewed  ED Management:  I will get urine, images, medicate and reassess.      Patient reassessed after medications.  Patient states feeling slightly better.  Patient and mother updated on imaging results.  Advised we will plan Ace wrap for comfort.  Air cast for comfort.  Patient advised to take ibuprofen and Robaxin as prescribed.  Continue with movement and stretches starting tomorrow to promote healing.  Work note given.  Patient advised to follow up with PCP or Orthopedics as needed.  Patient and mother verbalized understanding of this plan of care.  All questions and concerns addressed.    Patient is hemodynamically stable, vital signs are normal. Discharge instructions given. Return to ED precautions discussed. Follow up as directed. Pt verbalized understanding of this plan.  Pt is stable for discharge.                                  Clinical Impression:       ICD-10-CM ICD-9-CM   1. Moderate left ankle sprain, initial encounter S93.402A 845.00   2. Fall W19.XXXA E888.9         Disposition:   Disposition: Discharged  Condition: Stable                     Janett Zhao NP  01/07/20 [id number]

## 2020-01-07 NOTE — ED NOTES
Pt reports was walking yesterday in kitchen and fell on left foot, twisting ankle.  Denies LOC.  P/S/M intact to left toes, nailbeds pink/cap refill < 2 sec, pedal pulses intact bilaterally.  No obvious swelling or bruising.  Pain increases with weight bearing.  Ice pack applied, foot elevated on pillow for comfort.    APPEARANCE: Alert, oriented and in no acute distress.  CARDIAC: Normal rate and rhythm.   PERIPHERAL VASCULAR: peripheral pulses present. Normal cap refill. No edema. Warm to touch.    RESPIRATORY:Normal rate and effort, breath sounds clear bilaterally throughout chest. Respirations are equal and unlabored no obvious signs of distress.  GASTRO: softl, no tenderness, no abdominal distention.  SKIN: Skin is warm and dry, normal skin turgor, mucous membranes moist.  MENTAL STATUS: awake, alert and aware of environment.  EYE: PERRL, both eyes: pupils brisk and reactive to light. Normal size.  ENT: EARS: no obvious drainage. NOSE: no active bleeding.

## 2020-01-07 NOTE — DISCHARGE INSTRUCTIONS
Your imaging today showed no fractures or dislocations.  We have prescribed you ibuprofen for pain and swelling. I have also prescribed you Robaxin for muscle strain.  We have applied an Ace wrap for compression and an air cast for comfort.  Ice or heat for comfort.  Continue with stretches and movement starting tomorrow.  Follow up with your PCP or Orthopedics as needed.    Our goal in the emergency department is to always give you outstanding care and exceptional service. You may receive a survey by mail or e-mail in the next week regarding your experience in our ED. We would greatly appreciate your completing and returning the survey. Your feedback provides us with a way to recognize our staff who give very good care and it helps us learn how to improve when your experience was below our aspiration of excellence.

## 2020-07-09 ENCOUNTER — OFFICE VISIT (OUTPATIENT)
Dept: OBSTETRICS AND GYNECOLOGY | Facility: CLINIC | Age: 25
End: 2020-07-09
Payer: MEDICAID

## 2020-07-09 VITALS
SYSTOLIC BLOOD PRESSURE: 120 MMHG | WEIGHT: 138.5 LBS | DIASTOLIC BLOOD PRESSURE: 70 MMHG | BODY MASS INDEX: 25.49 KG/M2 | HEIGHT: 62 IN

## 2020-07-09 DIAGNOSIS — N94.9 ADNEXAL FULLNESS: ICD-10-CM

## 2020-07-09 DIAGNOSIS — N94.10 DYSPAREUNIA IN FEMALE: ICD-10-CM

## 2020-07-09 DIAGNOSIS — N91.5 OLIGOMENORRHEA, UNSPECIFIED TYPE: Primary | ICD-10-CM

## 2020-07-09 DIAGNOSIS — R14.0 BLOATING: ICD-10-CM

## 2020-07-09 LAB
B-HCG UR QL: NEGATIVE
CTP QC/QA: YES

## 2020-07-09 PROCEDURE — 99213 PR OFFICE/OUTPT VISIT, EST, LEVL III, 20-29 MIN: ICD-10-PCS | Mod: S$PBB,,, | Performed by: OBSTETRICS & GYNECOLOGY

## 2020-07-09 PROCEDURE — 99213 OFFICE O/P EST LOW 20 MIN: CPT | Mod: S$PBB,,, | Performed by: OBSTETRICS & GYNECOLOGY

## 2020-07-09 PROCEDURE — 99999 PR PBB SHADOW E&M-EST. PATIENT-LVL III: CPT | Mod: PBBFAC,,, | Performed by: OBSTETRICS & GYNECOLOGY

## 2020-07-09 PROCEDURE — 99999 PR PBB SHADOW E&M-EST. PATIENT-LVL III: ICD-10-PCS | Mod: PBBFAC,,, | Performed by: OBSTETRICS & GYNECOLOGY

## 2020-07-09 PROCEDURE — 99213 OFFICE O/P EST LOW 20 MIN: CPT | Mod: PBBFAC,PO | Performed by: OBSTETRICS & GYNECOLOGY

## 2020-07-09 RX ORDER — AMITRIPTYLINE HYDROCHLORIDE 25 MG/1
1 TABLET, FILM COATED ORAL
COMMUNITY
Start: 2020-06-28 | End: 2024-02-19

## 2020-07-09 RX ORDER — HYDROXYZINE HYDROCHLORIDE 25 MG/1
1 TABLET, FILM COATED ORAL
COMMUNITY
Start: 2020-06-20 | End: 2024-02-19

## 2020-07-09 RX ORDER — CITALOPRAM 20 MG/1
1 TABLET, FILM COATED ORAL
COMMUNITY
Start: 2020-07-06 | End: 2024-02-19

## 2020-07-09 NOTE — PROGRESS NOTES
OBSTETRIC HISTORY:   OB History        0    Para        Term                AB        Living           SAB        TAB        Ectopic        Multiple        Live Births                    COMPREHENSIVE GYN HISTORY:  PAP History: Reports abnormal Paps. Date:  Result: ASCUS +HRHPV Treatment: Repeat Pap  RECEIVED GARDASIL VACCINE  Infection History: Denies STDs. Denies PID.  Benign History: Denies uterine fibroids. Denies ovarian cysts. Denies endometriosis.   Cancer History: Denies cervical cancer. Denies uterine cancer or hyperplasia. Denies ovarian cancer. Denies vulvar cancer or pre-cancer. Denies vaginal cancer or pre-cancer. Denies breast cancer. Denies colon cancer.  Sexual Activity History:  reports that she currently engages in sexual activity. She reports using the following method of birth control/protection: OCP.   Menstrual History: Age of menarche: 13 years. Every 28 days, flows for 5 days. Moderate flow.  Dysmenorrhea History: Reports severe dysmenorrhea.      HPI:   25 y.o.  Patient's last menstrual period was 2020 (approximate).   Patient is  here complaining of abnormal periods, painful sex, and bloating. Period has become very light and spotty with mostly old blood. Still on Ortho Cyclen but generic brands have been changed. 20 pound weight gain. She denies bladder, breast complaints.    ROS:  GENERAL: Denies weight gain or weight loss. Feeling well overall.   SKIN: Denies rash or lesions.   HEAD: Denies headache.   NODES: Denies enlarged lymph nodes.   CHEST: Denies shortness of breath.   ABDOMEN: No abdominal pain, constipation, diarrhea, nausea, vomiting or rectal bleeding.   URINARY: No frequency, dysuria, hematuria, or burning on urination.  REPRODUCTIVE: See HPI.   BREASTS: The patient denies pain, lumps, or nipple discharge.   HEMATOLOGIC: No easy bruisability.   MUSCULOSKELETAL: Denies joint pain or back pain.   NEUROLOGIC: Denies weakness.   PSYCHIATRIC: Denies  "depression, anxiety or mood swings.    PE:   /70   Ht 5' 2" (1.575 m)   Wt 62.8 kg (138 lb 8 oz)   LMP 06/24/2020 (Approximate)   BMI 25.33 kg/m²   APPEARANCE: Well nourished, well developed, in no acute distress.  ABDOMEN: Soft. No tenderness or masses. No hernias. Asymmetric crests  PELVIC:   VULVA: No lesions. Normal female genitalia.  URETHRAL MEATUS: Normal size and location, no lesions, no prolapse.  URETHRA: No masses, tenderness, prolapse or scarring.  VAGINA: Moist and well rugated, no discharge, no significant cystocele or rectocele.  CERVIX: No lesions and discharge.  UTERUS: Normal size, regular shape, mobile, non-tender, bladder base nontender.  ADNEXA: Left adnexal fullness. +pelvic floor tenderness    ASSESSMENT:  Bloating  Light periods on OCP can be normal  Adnexal fullness-- TVUS  Dyspareunia-- could send for PFD PT but her insurance would not cover    PLAN:  RTO for US      "

## 2020-07-14 ENCOUNTER — PROCEDURE VISIT (OUTPATIENT)
Dept: OBSTETRICS AND GYNECOLOGY | Facility: CLINIC | Age: 25
End: 2020-07-14
Payer: MEDICAID

## 2020-07-14 DIAGNOSIS — N94.9 ADNEXAL FULLNESS: ICD-10-CM

## 2020-07-14 DIAGNOSIS — R14.0 BLOATING: ICD-10-CM

## 2020-07-14 PROCEDURE — 76830 TRANSVAGINAL US NON-OB: CPT | Mod: PBBFAC,PO

## 2020-07-14 PROCEDURE — 76830 TRANSVAGINAL US NON-OB: CPT | Mod: 26,S$PBB,, | Performed by: OBSTETRICS & GYNECOLOGY

## 2020-07-14 PROCEDURE — 76830 PR  ECHOGRAPHY,TRANSVAGINAL: ICD-10-PCS | Mod: 26,S$PBB,, | Performed by: OBSTETRICS & GYNECOLOGY

## 2020-07-20 ENCOUNTER — PATIENT MESSAGE (OUTPATIENT)
Dept: OBSTETRICS AND GYNECOLOGY | Facility: CLINIC | Age: 25
End: 2020-07-20

## 2020-10-15 RX ORDER — NORGESTIMATE AND ETHINYL ESTRADIOL 0.25-0.035
KIT ORAL
Qty: 28 TABLET | Refills: 11 | OUTPATIENT
Start: 2020-10-15

## 2020-10-16 RX ORDER — NORGESTIMATE AND ETHINYL ESTRADIOL 0.25-0.035
1 KIT ORAL DAILY
Qty: 28 TABLET | Refills: 1 | Status: SHIPPED | OUTPATIENT
Start: 2020-10-16 | End: 2020-12-02 | Stop reason: SDUPTHER

## 2020-10-16 NOTE — TELEPHONE ENCOUNTER
----- Message from Noelle Lorenzana sent at 10/16/2020  3:20 PM CDT -----  Contact: [phone]/Self  Type: Requesting to speak with nurse    Who Called: PT  Regarding: refill on birth control , [facility] and Asiya Bernardo.    Would the patient rather a call back or a response via [facility]? Call back  Best Call Back Number: [phone]  Additional Information:

## 2020-10-16 NOTE — TELEPHONE ENCOUNTER
Patient notified the prescription refill was denied by Dr. Mcdonough because she is due for her annual and have not scheduled. Patient verbalized understanding and scheduled for the next available on 12/2/2020 @ 11am.  Notified the two packs will be sent out but needs to be sure to keep the appointment because Dr. Mcdonough will not extend the prescription past that date until she is current with her well woman exam.  Patient verbalized understanding

## 2020-12-02 ENCOUNTER — OFFICE VISIT (OUTPATIENT)
Dept: OBSTETRICS AND GYNECOLOGY | Facility: CLINIC | Age: 25
End: 2020-12-02
Payer: MEDICAID

## 2020-12-02 VITALS
HEIGHT: 62 IN | SYSTOLIC BLOOD PRESSURE: 110 MMHG | WEIGHT: 146.88 LBS | BODY MASS INDEX: 27.03 KG/M2 | DIASTOLIC BLOOD PRESSURE: 78 MMHG

## 2020-12-02 DIAGNOSIS — Z87.42 HISTORY OF ABNORMAL CERVICAL PAP SMEAR: ICD-10-CM

## 2020-12-02 DIAGNOSIS — Z12.4 SCREENING FOR CERVICAL CANCER: ICD-10-CM

## 2020-12-02 DIAGNOSIS — Z01.419 WELL WOMAN EXAM WITH ROUTINE GYNECOLOGICAL EXAM: Primary | ICD-10-CM

## 2020-12-02 PROCEDURE — 99999 PR PBB SHADOW E&M-EST. PATIENT-LVL III: ICD-10-PCS | Mod: PBBFAC,,, | Performed by: OBSTETRICS & GYNECOLOGY

## 2020-12-02 PROCEDURE — 99213 OFFICE O/P EST LOW 20 MIN: CPT | Mod: PBBFAC,PO | Performed by: OBSTETRICS & GYNECOLOGY

## 2020-12-02 PROCEDURE — 99999 PR PBB SHADOW E&M-EST. PATIENT-LVL III: CPT | Mod: PBBFAC,,, | Performed by: OBSTETRICS & GYNECOLOGY

## 2020-12-02 PROCEDURE — 88175 CYTOPATH C/V AUTO FLUID REDO: CPT

## 2020-12-02 PROCEDURE — 99395 PREV VISIT EST AGE 18-39: CPT | Mod: S$PBB,,, | Performed by: OBSTETRICS & GYNECOLOGY

## 2020-12-02 PROCEDURE — 99395 PR PREVENTIVE VISIT,EST,18-39: ICD-10-PCS | Mod: S$PBB,,, | Performed by: OBSTETRICS & GYNECOLOGY

## 2020-12-02 RX ORDER — NORGESTIMATE AND ETHINYL ESTRADIOL 0.25-0.035
1 KIT ORAL DAILY
Qty: 28 TABLET | Refills: 11 | Status: SHIPPED | OUTPATIENT
Start: 2020-12-02 | End: 2020-12-02

## 2020-12-02 NOTE — PROGRESS NOTES
"OBSTETRIC HISTORY:   OB History        0    Para        Term                AB        Living           SAB        TAB        Ectopic        Multiple        Live Births                    COMPREHENSIVE GYN HISTORY:  PAP History: Reports abnormal Paps. Date:  Result: ASCUS +HRHPV Treatment: Repeat Pap  RECEIVED GARDASIL VACCINE  Infection History: Denies STDs. Denies PID.  Benign History: Denies uterine fibroids. Denies ovarian cysts. Denies endometriosis.   Cancer History: Denies cervical cancer. Denies uterine cancer or hyperplasia. Denies ovarian cancer. Denies vulvar cancer or pre-cancer. Denies vaginal cancer or pre-cancer. Denies breast cancer. Denies colon cancer.  Sexual Activity History:  reports that she currently engages in sexual activity. She reports using the following method of birth control/protection: OCP.   Menstrual History: Age of menarche: 13 years. Every 28 days, flows for 5 days. Moderate flow.  Dysmenorrhea History: Reports severe dysmenorrhea.      HPI:   25 y.o.  Patient's last menstrual period was 11/10/2020 (exact date).   Patient is  here for her annual gynecologic exam.  She has no complaints. She denies bladder, breast and bowel complaints.    ROS:  GENERAL: Denies weight gain or weight loss. Feeling well overall.   SKIN: Denies rash or lesions.   HEAD: Denies headache.   NODES: Denies enlarged lymph nodes.   CHEST: Denies shortness of breath.   ABDOMEN: No abdominal pain, constipation, diarrhea, nausea, vomiting or rectal bleeding.   URINARY: No frequency, dysuria, hematuria, or burning on urination.  REPRODUCTIVE: See HPI.   BREASTS: The patient denies pain, lumps, or nipple discharge.   HEMATOLOGIC: No easy bruisability.   MUSCULOSKELETAL: Denies joint pain or back pain.   NEUROLOGIC: Denies weakness.   PSYCHIATRIC: Denies depression, anxiety or mood swings.    PE:   /78   Ht 5' 2" (1.575 m)   Wt 66.6 kg (146 lb 14.4 oz)   LMP 11/10/2020 (Exact Date)   BMI " 26.87 kg/m²   APPEARANCE: Well nourished, well developed, in no acute distress.  NECK: Neck symmetric without  thyromegaly.  NODES: No inguinal, cervical lymph node enlargement.  CHEST: Lungs clear to auscultation.  HEART: Regular rate and rhythm, no murmurs, rubs or gallops.  ABDOMEN: Soft. No tenderness or masses. No hernias.  BREASTS: Symmetrical, no skin changes or visible lesions. No palpable masses, nipple discharge or adenopathy bilaterally.  PELVIC:   VULVA: No lesions. Normal female genitalia.  URETHRAL MEATUS: Normal size and location, no lesions, no prolapse.  URETHRA: No masses, tenderness, prolapse or scarring.  VAGINA: Moist and well rugated, no discharge, no significant cystocele or rectocele.  CERVIX: No lesions and discharge.  UTERUS: Normal size, regular shape, mobile, non-tender, bladder base nontender.  ADNEXA: No masses or tenderness.    PROCEDURES:  Pap smear    Assessment:  Normal Gynecologic Exam  History of abnormal pap    Plan:  Mammogram and Colonoscopy if indicated by current recommendations.  Return to clinic in one year or for any problems or complaints.    Counseling:  Patient was counseled today on A.C.S. Pap guidelines and recommendations for yearly pelvic exams and monthly self breast exams; to see her PCP for other health maintenance. Regular exercise and healthy diet.

## 2020-12-12 LAB
FINAL PATHOLOGIC DIAGNOSIS: NORMAL
Lab: NORMAL

## 2021-02-19 ENCOUNTER — OCCUPATIONAL HEALTH (OUTPATIENT)
Dept: URGENT CARE | Facility: CLINIC | Age: 26
End: 2021-02-19

## 2021-02-19 DIAGNOSIS — Z02.83 ENCOUNTER FOR DRUG SCREENING: Primary | ICD-10-CM

## 2021-02-19 PROCEDURE — 80305 OOH NON-DOT DRUG SCREEN: ICD-10-PCS | Mod: S$GLB,,, | Performed by: PREVENTIVE MEDICINE

## 2021-02-19 PROCEDURE — 80305 DRUG TEST PRSMV DIR OPT OBS: CPT | Mod: S$GLB,,, | Performed by: PREVENTIVE MEDICINE

## 2022-02-24 ENCOUNTER — TELEPHONE (OUTPATIENT)
Dept: OBSTETRICS AND GYNECOLOGY | Facility: CLINIC | Age: 27
End: 2022-02-24
Payer: MEDICAID

## 2022-02-25 NOTE — TELEPHONE ENCOUNTER
I called and scheduled patient for an annual appointment on 3/10/22. Patient would like to get a refill on her OCP to hold her over until her appointment.    Yany Rodriguez MA  02/25/2022 1:57 PM

## 2022-02-25 NOTE — TELEPHONE ENCOUNTER
Last Pap was 12/2020. Will need an appointment for an annual asap as has a history of abnormal pap smears in the past. Will refill OCP only enough to get to appt once appt has been made

## 2022-02-25 NOTE — TELEPHONE ENCOUNTER
----- Message from Rossy Sood sent at 2/24/2022  2:37 PM CST -----  Type:  Sooner Apoointment Request    Caller is requesting a sooner appointment.  Caller declined first available appointment listed below.  Caller will not accept being placed on the waitlist and is requesting a message be sent to doctor.  Name of Caller: Pt   When is the first available appointment? Na   Symptoms: annual   Would the patient rather a call back or a response via [facility]? Call back   Best Call Back Number: [phone]  Additional Information:

## 2022-02-25 NOTE — TELEPHONE ENCOUNTER
Pt has not been seen since 2020. No appointment made requesting refills    Left message to return call

## 2022-02-26 RX ORDER — NORGESTIMATE AND ETHINYL ESTRADIOL 0.25-0.035
1 KIT ORAL DAILY
Qty: 28 TABLET | Refills: 11 | Status: CANCELLED | OUTPATIENT
Start: 2022-02-26

## 2022-02-28 RX ORDER — NORGESTIMATE AND ETHINYL ESTRADIOL 0.25-0.035
1 KIT ORAL DAILY
Qty: 28 TABLET | Refills: 0 | Status: SHIPPED | OUTPATIENT
Start: 2022-02-28 | End: 2022-03-10 | Stop reason: SDUPTHER

## 2022-02-28 NOTE — TELEPHONE ENCOUNTER
Patient is scheduled in March for her annual. She was last seen in 2020.    Yany Rodriguez MA  02/28/2022 9:27 AM

## 2022-03-10 ENCOUNTER — OFFICE VISIT (OUTPATIENT)
Dept: OBSTETRICS AND GYNECOLOGY | Facility: CLINIC | Age: 27
End: 2022-03-10
Payer: MEDICAID

## 2022-03-10 VITALS
BODY MASS INDEX: 26.67 KG/M2 | WEIGHT: 145.81 LBS | SYSTOLIC BLOOD PRESSURE: 118 MMHG | DIASTOLIC BLOOD PRESSURE: 70 MMHG

## 2022-03-10 DIAGNOSIS — R87.619 ABNORMAL CERVICAL PAPANICOLAOU SMEAR, UNSPECIFIED ABNORMAL PAP FINDING: ICD-10-CM

## 2022-03-10 DIAGNOSIS — Z12.4 ROUTINE CERVICAL SMEAR: ICD-10-CM

## 2022-03-10 DIAGNOSIS — Z01.419 WELL WOMAN EXAM WITH ROUTINE GYNECOLOGICAL EXAM: Primary | ICD-10-CM

## 2022-03-10 DIAGNOSIS — Z11.3 SCREEN FOR STD (SEXUALLY TRANSMITTED DISEASE): ICD-10-CM

## 2022-03-10 PROCEDURE — 1159F MED LIST DOCD IN RCRD: CPT | Mod: CPTII,,, | Performed by: OBSTETRICS & GYNECOLOGY

## 2022-03-10 PROCEDURE — 88142 CYTOPATH C/V THIN LAYER: CPT | Performed by: OBSTETRICS & GYNECOLOGY

## 2022-03-10 PROCEDURE — 3008F PR BODY MASS INDEX (BMI) DOCUMENTED: ICD-10-PCS | Mod: CPTII,,, | Performed by: OBSTETRICS & GYNECOLOGY

## 2022-03-10 PROCEDURE — 3074F SYST BP LT 130 MM HG: CPT | Mod: CPTII,,, | Performed by: OBSTETRICS & GYNECOLOGY

## 2022-03-10 PROCEDURE — 87591 N.GONORRHOEAE DNA AMP PROB: CPT | Performed by: OBSTETRICS & GYNECOLOGY

## 2022-03-10 PROCEDURE — 1160F RVW MEDS BY RX/DR IN RCRD: CPT | Mod: CPTII,,, | Performed by: OBSTETRICS & GYNECOLOGY

## 2022-03-10 PROCEDURE — 3078F PR MOST RECENT DIASTOLIC BLOOD PRESSURE < 80 MM HG: ICD-10-PCS | Mod: CPTII,,, | Performed by: OBSTETRICS & GYNECOLOGY

## 2022-03-10 PROCEDURE — 3074F PR MOST RECENT SYSTOLIC BLOOD PRESSURE < 130 MM HG: ICD-10-PCS | Mod: CPTII,,, | Performed by: OBSTETRICS & GYNECOLOGY

## 2022-03-10 PROCEDURE — 1160F PR REVIEW ALL MEDS BY PRESCRIBER/CLIN PHARMACIST DOCUMENTED: ICD-10-PCS | Mod: CPTII,,, | Performed by: OBSTETRICS & GYNECOLOGY

## 2022-03-10 PROCEDURE — 87491 CHLMYD TRACH DNA AMP PROBE: CPT | Performed by: OBSTETRICS & GYNECOLOGY

## 2022-03-10 PROCEDURE — 99395 PR PREVENTIVE VISIT,EST,18-39: ICD-10-PCS | Mod: S$PBB,,, | Performed by: OBSTETRICS & GYNECOLOGY

## 2022-03-10 PROCEDURE — 99395 PREV VISIT EST AGE 18-39: CPT | Mod: S$PBB,,, | Performed by: OBSTETRICS & GYNECOLOGY

## 2022-03-10 PROCEDURE — 3008F BODY MASS INDEX DOCD: CPT | Mod: CPTII,,, | Performed by: OBSTETRICS & GYNECOLOGY

## 2022-03-10 PROCEDURE — 99999 PR PBB SHADOW E&M-EST. PATIENT-LVL III: ICD-10-PCS | Mod: PBBFAC,,, | Performed by: OBSTETRICS & GYNECOLOGY

## 2022-03-10 PROCEDURE — 3078F DIAST BP <80 MM HG: CPT | Mod: CPTII,,, | Performed by: OBSTETRICS & GYNECOLOGY

## 2022-03-10 PROCEDURE — 99999 PR PBB SHADOW E&M-EST. PATIENT-LVL III: CPT | Mod: PBBFAC,,, | Performed by: OBSTETRICS & GYNECOLOGY

## 2022-03-10 PROCEDURE — 99213 OFFICE O/P EST LOW 20 MIN: CPT | Mod: PBBFAC,PO | Performed by: OBSTETRICS & GYNECOLOGY

## 2022-03-10 PROCEDURE — 1159F PR MEDICATION LIST DOCUMENTED IN MEDICAL RECORD: ICD-10-PCS | Mod: CPTII,,, | Performed by: OBSTETRICS & GYNECOLOGY

## 2022-03-10 RX ORDER — NORGESTIMATE AND ETHINYL ESTRADIOL 0.25-0.035
1 KIT ORAL DAILY
Qty: 84 TABLET | Refills: 3 | Status: SHIPPED | OUTPATIENT
Start: 2022-03-10 | End: 2022-03-27

## 2022-03-10 NOTE — PROGRESS NOTES
OBSTETRIC HISTORY:   OB History        0    Para        Term                AB        Living           SAB        IAB        Ectopic        Multiple        Live Births                    COMPREHENSIVE GYN HISTORY:  PAP History: Reports abnormal Paps. Date:  Result: ASCUS +HRHPV Treatment: Repeat Pap  RECEIVED GARDASIL VACCINE  Infection History: Denies STDs. Denies PID.  Benign History: Denies uterine fibroids. Denies ovarian cysts. Denies endometriosis.   Cancer History: Denies cervical cancer. Denies uterine cancer or hyperplasia. Denies ovarian cancer. Denies vulvar cancer or pre-cancer. Denies vaginal cancer or pre-cancer. Denies breast cancer. Denies colon cancer.  Sexual Activity History:  reports that she currently engages in sexual activity. She reports using the following method of birth control/protection: OCP.   Menstrual History: Age of menarche: 13 years. Every 28 days, flows for 5 days. Moderate flow.  Dysmenorrhea History: Reports severe dysmenorrhea.         HPI:   27 y.o.  No LMP recorded.   Patient is  here for her annual gynecologic exam.  She has no GYN complaints. She denies bladder, breast and bowel complaints.    ROS:  GENERAL: Denies weight gain or weight loss. Feeling well overall.   SKIN: Denies rash or lesions.   HEAD: Denies headache.   NODES: Denies enlarged lymph nodes.   CHEST: Denies shortness of breath.   ABDOMEN: No abdominal pain, constipation, diarrhea, nausea, vomiting or rectal bleeding.   URINARY: No frequency, dysuria, hematuria, or burning on urination.  REPRODUCTIVE: See HPI.   BREASTS: The patient denies pain, lumps, or nipple discharge.   HEMATOLOGIC: No easy bruisability.   MUSCULOSKELETAL: Denies joint pain or back pain.   NEUROLOGIC: Denies weakness.   PSYCHIATRIC: Denies depression, anxiety or mood swings.    PE:   /70   Wt 66.2 kg (145 lb 13.4 oz)   BMI 26.67 kg/m²   APPEARANCE: Well nourished, well developed, in no acute distress.  NECK:  Neck symmetric without  thyromegaly.  NODES: No inguinal, cervical lymph node enlargement.  CHEST: Lungs clear to auscultation.  HEART: Regular rate and rhythm, no murmurs, rubs or gallops.  ABDOMEN: Soft. No tenderness or masses. No hernias.  BREASTS: Symmetrical, no skin changes or visible lesions. No palpable masses, nipple discharge or adenopathy bilaterally.  PELVIC:   VULVA: No lesions. Normal female genitalia.  URETHRAL MEATUS: Normal size and location, no lesions, no prolapse.  URETHRA: No masses, tenderness, prolapse or scarring.  VAGINA: Moist and well rugated, no discharge, no significant cystocele or rectocele.  CERVIX: No lesions and discharge.  UTERUS: Normal size, regular shape, mobile, non-tender, bladder base nontender.  ADNEXA: No masses or tenderness.    PROCEDURES:  Pap smear  GC/CT    Assessment:  Normal Gynecologic Exam    Plan:  Mammogram and Colonoscopy if indicated by current recommendations.  Return to clinic in one year or for any problems or complaints.    Counseling:  Patient was counseled today on A.C.S. Pap guidelines and recommendations for yearly pelvic exams and monthly self breast exams; to see her PCP for other health maintenance. Regular exercise and healthy diet.     As of April 1, 2021, the Cures Act has been passed nationally. This new law requires that all doctors progress notes, lab results, pathology reports and radiology reports be released IMMEDIATELY to the patient in the patient portal. That means that the results are released to you at the EXACT same time they are released to me. Therefore, with all of the patients that I have I am not able to reply to each patient exactly when the results come in. So there will be a delay from when you see the results to when I see them and have time to come up with a response to send you. Also I only see these results when I am on the computer at work. So if the results come in over the weekend or after 5 pm of a work day, I will not  see them until the next business day. As you can tell, this is a challenge as a physician to give every patient the quick response they hope for and deserve. So please be patient!     Thanks for understanding,

## 2022-03-15 LAB
C TRACH DNA SPEC QL NAA+PROBE: NOT DETECTED
N GONORRHOEA DNA SPEC QL NAA+PROBE: NOT DETECTED

## 2022-03-17 LAB
FINAL PATHOLOGIC DIAGNOSIS: NORMAL
Lab: NORMAL

## 2022-09-12 ENCOUNTER — OFFICE VISIT (OUTPATIENT)
Dept: OBSTETRICS AND GYNECOLOGY | Facility: CLINIC | Age: 27
End: 2022-09-12
Payer: MEDICAID

## 2022-09-12 VITALS
DIASTOLIC BLOOD PRESSURE: 68 MMHG | WEIGHT: 165.38 LBS | SYSTOLIC BLOOD PRESSURE: 119 MMHG | BODY MASS INDEX: 30.24 KG/M2

## 2022-09-12 DIAGNOSIS — Z30.41 ENCOUNTER FOR SURVEILLANCE OF CONTRACEPTIVE PILLS: Primary | ICD-10-CM

## 2022-09-12 PROCEDURE — 3008F BODY MASS INDEX DOCD: CPT | Mod: CPTII,,, | Performed by: NURSE PRACTITIONER

## 2022-09-12 PROCEDURE — 99499 UNLISTED E&M SERVICE: CPT | Mod: S$PBB,,, | Performed by: NURSE PRACTITIONER

## 2022-09-12 PROCEDURE — 1159F PR MEDICATION LIST DOCUMENTED IN MEDICAL RECORD: ICD-10-PCS | Mod: CPTII,,, | Performed by: NURSE PRACTITIONER

## 2022-09-12 PROCEDURE — 99212 OFFICE O/P EST SF 10 MIN: CPT | Mod: PBBFAC,PO | Performed by: NURSE PRACTITIONER

## 2022-09-12 PROCEDURE — 3078F PR MOST RECENT DIASTOLIC BLOOD PRESSURE < 80 MM HG: ICD-10-PCS | Mod: CPTII,,, | Performed by: NURSE PRACTITIONER

## 2022-09-12 PROCEDURE — 99499 NO LOS: ICD-10-PCS | Mod: S$PBB,,, | Performed by: NURSE PRACTITIONER

## 2022-09-12 PROCEDURE — 99999 PR PBB SHADOW E&M-EST. PATIENT-LVL II: ICD-10-PCS | Mod: PBBFAC,,, | Performed by: NURSE PRACTITIONER

## 2022-09-12 PROCEDURE — 3074F PR MOST RECENT SYSTOLIC BLOOD PRESSURE < 130 MM HG: ICD-10-PCS | Mod: CPTII,,, | Performed by: NURSE PRACTITIONER

## 2022-09-12 PROCEDURE — 3074F SYST BP LT 130 MM HG: CPT | Mod: CPTII,,, | Performed by: NURSE PRACTITIONER

## 2022-09-12 PROCEDURE — 1160F PR REVIEW ALL MEDS BY PRESCRIBER/CLIN PHARMACIST DOCUMENTED: ICD-10-PCS | Mod: CPTII,,, | Performed by: NURSE PRACTITIONER

## 2022-09-12 PROCEDURE — 3008F PR BODY MASS INDEX (BMI) DOCUMENTED: ICD-10-PCS | Mod: CPTII,,, | Performed by: NURSE PRACTITIONER

## 2022-09-12 PROCEDURE — 99999 PR PBB SHADOW E&M-EST. PATIENT-LVL II: CPT | Mod: PBBFAC,,, | Performed by: NURSE PRACTITIONER

## 2022-09-12 PROCEDURE — 1159F MED LIST DOCD IN RCRD: CPT | Mod: CPTII,,, | Performed by: NURSE PRACTITIONER

## 2022-09-12 PROCEDURE — 3078F DIAST BP <80 MM HG: CPT | Mod: CPTII,,, | Performed by: NURSE PRACTITIONER

## 2022-09-12 PROCEDURE — 1160F RVW MEDS BY RX/DR IN RCRD: CPT | Mod: CPTII,,, | Performed by: NURSE PRACTITIONER

## 2022-09-12 RX ORDER — NORGESTIMATE AND ETHINYL ESTRADIOL 0.25-0.035
1 KIT ORAL DAILY
Qty: 28 TABLET | Refills: 7 | Status: SHIPPED | OUTPATIENT
Start: 2022-09-12

## 2022-09-12 NOTE — PROGRESS NOTES
Here for annual, she has had annual 3/2022, denies any other complaints. [facility] notified her stating she did not have refills on OCP, in our system it shows OCP sent with 11 refill 3/2022.New OCP refill sent.

## 2023-05-09 ENCOUNTER — OCCUPATIONAL HEALTH (OUTPATIENT)
Dept: URGENT CARE | Facility: CLINIC | Age: 28
End: 2023-05-09

## 2023-05-09 DIAGNOSIS — Z02.1 PHYSICAL EXAM, PRE-EMPLOYMENT: Primary | ICD-10-CM

## 2023-05-09 LAB
CTP QC/QA: YES
POC 5 PANEL DRUG SCREEN: NEGATIVE

## 2023-05-09 PROCEDURE — 80305 DRUG TEST PRSMV DIR OPT OBS: CPT | Mod: S$GLB,,, | Performed by: NURSE PRACTITIONER

## 2023-05-09 PROCEDURE — 80305 POCT RAPID DRUG SCREEN 5 PANEL: ICD-10-PCS | Mod: S$GLB,,, | Performed by: NURSE PRACTITIONER

## 2024-02-19 ENCOUNTER — OFFICE VISIT (OUTPATIENT)
Dept: OBSTETRICS AND GYNECOLOGY | Facility: CLINIC | Age: 29
End: 2024-02-19
Payer: MEDICAID

## 2024-02-19 VITALS — BODY MASS INDEX: 30.6 KG/M2 | SYSTOLIC BLOOD PRESSURE: 130 MMHG | DIASTOLIC BLOOD PRESSURE: 85 MMHG | WEIGHT: 167.31 LBS

## 2024-02-19 DIAGNOSIS — Z12.4 ROUTINE CERVICAL SMEAR: ICD-10-CM

## 2024-02-19 DIAGNOSIS — Z01.419 WELL WOMAN EXAM WITH ROUTINE GYNECOLOGICAL EXAM: Primary | ICD-10-CM

## 2024-02-19 DIAGNOSIS — N92.6 LATE MENSES: ICD-10-CM

## 2024-02-19 PROCEDURE — 99999 PR PBB SHADOW E&M-EST. PATIENT-LVL III: CPT | Mod: PBBFAC,,, | Performed by: OBSTETRICS & GYNECOLOGY

## 2024-02-19 PROCEDURE — 3079F DIAST BP 80-89 MM HG: CPT | Mod: CPTII,,, | Performed by: OBSTETRICS & GYNECOLOGY

## 2024-02-19 PROCEDURE — 99395 PREV VISIT EST AGE 18-39: CPT | Mod: S$PBB,,, | Performed by: OBSTETRICS & GYNECOLOGY

## 2024-02-19 PROCEDURE — 3008F BODY MASS INDEX DOCD: CPT | Mod: CPTII,,, | Performed by: OBSTETRICS & GYNECOLOGY

## 2024-02-19 PROCEDURE — 1160F RVW MEDS BY RX/DR IN RCRD: CPT | Mod: CPTII,,, | Performed by: OBSTETRICS & GYNECOLOGY

## 2024-02-19 PROCEDURE — 1159F MED LIST DOCD IN RCRD: CPT | Mod: CPTII,,, | Performed by: OBSTETRICS & GYNECOLOGY

## 2024-02-19 PROCEDURE — 3075F SYST BP GE 130 - 139MM HG: CPT | Mod: CPTII,,, | Performed by: OBSTETRICS & GYNECOLOGY

## 2024-02-19 PROCEDURE — 88175 CYTOPATH C/V AUTO FLUID REDO: CPT | Performed by: OBSTETRICS & GYNECOLOGY

## 2024-02-19 PROCEDURE — 99213 OFFICE O/P EST LOW 20 MIN: CPT | Mod: PBBFAC,PO | Performed by: OBSTETRICS & GYNECOLOGY

## 2024-02-19 RX ORDER — NORTRIPTYLINE HYDROCHLORIDE 10 MG/1
CAPSULE ORAL
COMMUNITY
Start: 2023-09-21 | End: 2024-04-05

## 2024-02-19 RX ORDER — TOPIRAMATE 50 MG/1
TABLET, FILM COATED ORAL
COMMUNITY
Start: 2023-03-20 | End: 2024-04-05

## 2024-02-19 RX ORDER — METHOCARBAMOL 500 MG/1
TABLET, FILM COATED ORAL
COMMUNITY
Start: 2023-09-21 | End: 2024-04-05

## 2024-02-19 NOTE — PROGRESS NOTES
Patient examined and note reviewed and changed where appropriate with Physician Assistant student

## 2024-02-19 NOTE — PROGRESS NOTES
OBSTETRIC HISTORY:   OB History          0    Para        Term                AB        Living             SAB        IAB        Ectopic        Multiple        Live Births                      COMPREHENSIVE GYN HISTORY:  PAP History: Denies abnormal Paps.  Infection History: Denies STDs. Denies PID.  Benign History: Denies uterine fibroids. Denies ovarian cysts. Denies endometriosis.   Cancer History: Denies cervical cancer. Denies uterine cancer or hyperplasia. Denies ovarian cancer. Denies vulvar cancer or pre-cancer. Denies vaginal cancer or pre-cancer. Denies breast cancer. Denies colon cancer.  Sexual Activity History:   reports being sexually active and has had partner(s) who are male. She reports using the following method of birth control/protection: None  Menstrual History: Every month, flows for 3 days. Light-moderate flow.  Dysmenorrhea History: Reports dysmenorrhea.  Contraception: None    HPI:   29 y.o.  Patient's last menstrual period was 2024.   Patient is here for her annual gynecologic exam. She reports stopping her OPC's in 2023 due to wanting to eventually get pregnant. She reports not having a period this month. LMP 24. She has had 2 negative at-home pregnancy tests. Reports some cramping x3 weeks and urinary urgency. Reports periods were normal 3 months after stopping OPC. Periods are light-moderate, occurring every month, lasting for 3 days. Reports using tampons every 3 hours. She has no GYN complaints. Last pap smear 3/2022. Denies hx of abnormal paps or STD.  She denies breast and bowel complaints.    ROS:  GENERAL: No unexpected weight gain or weight loss.   CHEST: No shortness of breath.   ABDOMEN: +abdominal cramping. No constipation, diarrhea, nausea, vomiting or rectal bleeding.   URINARY: +Urinary urgency. No frequency, dysuria, hematuria, or burning on urination.  REPRODUCTIVE: See HPI.   BREASTS: No breast pain, lumps, or nipple discharge.    PSYCHIATRIC: No depression or anxiety.    PE:   /85   Wt 75.9 kg (167 lb 5.3 oz)   LMP 01/14/2024   BMI 30.60 kg/m²   APPEARANCE: Well nourished, well developed, in no acute distress.  NECK: Neck symmetric without  thyromegaly.  NODES: No inguinal, cervical lymph node enlargement.  CHEST: Lungs clear to auscultation.  HEART: Regular rate and rhythm, no murmurs, rubs or gallops.  ABDOMEN: Soft. No tenderness or masses. No hernias.  BREASTS: Symmetrical, no skin changes or visible lesions. No palpable masses, nipple discharge or adenopathy bilaterally.  PELVIC:   VULVA: No lesions. Normal female genitalia.  URETHRAL MEATUS: Normal size and location, no lesions, no prolapse.  URETHRA: No masses, tenderness, prolapse or scarring.  VAGINA: Moist and well rugated, no discharge, no significant cystocele or rectocele.  CERVIX: No lesions and discharge.  UTERUS: Normal size, regular shape, mobile, non-tender, bladder base nontender.  ADNEXA: No masses or tenderness.    PROCEDURES:  Pap smear    Assessment:  Normal Gynecologic Exam    Plan:  Mammogram and Colonoscopy if indicated by current recommendations.  Recommend taking prenatal vitamins.   Urine dipstick reviewed.  Return to clinic in one year or for any problems or complaints.    Counseling:  Patient was counseled today on A.C.S. Pap guidelines and recommendations for yearly pelvic exams and monthly self breast exams; to see her PCP for other health maintenance. Regular exercise and healthy diet.     ASHLEY Anne    As of April 1, 2021, the Cures Act has been passed nationally. This new law requires that all doctors progress notes, lab results, pathology reports and radiology reports be released IMMEDIATELY to the patient in the patient portal. That means that the results are released to you at the EXACT same time they are released to me. Therefore, with all of the patients that I have I am not able to reply to each patient exactly when the results  come in. So there will be a delay from when you see the results to when I see them and have time to come up with a response to send you. Also I only see these results when I am on the computer at work. So if the results come in over the weekend or after 5 pm of a work day, I will not see them until the next business day. As you can tell, this is a challenge as a physician to give every patient the quick response they hope for and deserve. So please be patient!     Thanks for understanding,

## 2024-02-23 LAB
FINAL PATHOLOGIC DIAGNOSIS: NORMAL
Lab: NORMAL

## 2024-04-05 ENCOUNTER — HOSPITAL ENCOUNTER (OUTPATIENT)
Dept: RADIOLOGY | Facility: HOSPITAL | Age: 29
Discharge: HOME | End: 2024-04-05
Attending: OBSTETRICS & GYNECOLOGY
Payer: MEDICAID

## 2024-04-05 ENCOUNTER — OFFICE VISIT (OUTPATIENT)
Dept: OBSTETRICS AND GYNECOLOGY | Facility: CLINIC | Age: 29
End: 2024-04-05
Payer: MEDICAID

## 2024-04-05 VITALS
SYSTOLIC BLOOD PRESSURE: 133 MMHG | WEIGHT: 168.13 LBS | BODY MASS INDEX: 30.75 KG/M2 | DIASTOLIC BLOOD PRESSURE: 87 MMHG

## 2024-04-05 DIAGNOSIS — Z32.00 ENCOUNTER FOR CONFIRMATION OF PREGNANCY TEST RESULT WITH PHYSICAL EXAMINATION: Primary | ICD-10-CM

## 2024-04-05 DIAGNOSIS — O36.80X0 PREGNANCY OF UNKNOWN ANATOMIC LOCATION: ICD-10-CM

## 2024-04-05 LAB
B-HCG UR QL: POSITIVE
CTP QC/QA: YES

## 2024-04-05 PROCEDURE — 99213 OFFICE O/P EST LOW 20 MIN: CPT | Mod: PBBFAC,TH,PO,25 | Performed by: OBSTETRICS & GYNECOLOGY

## 2024-04-05 PROCEDURE — 99999PBSHW POCT URINE PREGNANCY: Mod: PBBFAC,,,

## 2024-04-05 PROCEDURE — 3008F BODY MASS INDEX DOCD: CPT | Mod: CPTII,,, | Performed by: OBSTETRICS & GYNECOLOGY

## 2024-04-05 PROCEDURE — 87086 URINE CULTURE/COLONY COUNT: CPT | Performed by: OBSTETRICS & GYNECOLOGY

## 2024-04-05 PROCEDURE — 1160F RVW MEDS BY RX/DR IN RCRD: CPT | Mod: CPTII,,, | Performed by: OBSTETRICS & GYNECOLOGY

## 2024-04-05 PROCEDURE — 3079F DIAST BP 80-89 MM HG: CPT | Mod: CPTII,,, | Performed by: OBSTETRICS & GYNECOLOGY

## 2024-04-05 PROCEDURE — 76801 OB US < 14 WKS SINGLE FETUS: CPT | Mod: 26,,, | Performed by: RADIOLOGY

## 2024-04-05 PROCEDURE — 87491 CHLMYD TRACH DNA AMP PROBE: CPT | Performed by: OBSTETRICS & GYNECOLOGY

## 2024-04-05 PROCEDURE — 76801 OB US < 14 WKS SINGLE FETUS: CPT | Mod: TC

## 2024-04-05 PROCEDURE — 99999 PR PBB SHADOW E&M-EST. PATIENT-LVL III: CPT | Mod: PBBFAC,,, | Performed by: OBSTETRICS & GYNECOLOGY

## 2024-04-05 PROCEDURE — 81025 URINE PREGNANCY TEST: CPT | Mod: PBBFAC,PO | Performed by: OBSTETRICS & GYNECOLOGY

## 2024-04-05 PROCEDURE — 76817 TRANSVAGINAL US OBSTETRIC: CPT | Mod: 26,,, | Performed by: RADIOLOGY

## 2024-04-05 PROCEDURE — 99203 OFFICE O/P NEW LOW 30 MIN: CPT | Mod: TH,S$PBB,, | Performed by: OBSTETRICS & GYNECOLOGY

## 2024-04-05 PROCEDURE — 1159F MED LIST DOCD IN RCRD: CPT | Mod: CPTII,,, | Performed by: OBSTETRICS & GYNECOLOGY

## 2024-04-05 PROCEDURE — 3075F SYST BP GE 130 - 139MM HG: CPT | Mod: CPTII,,, | Performed by: OBSTETRICS & GYNECOLOGY

## 2024-04-05 NOTE — PROGRESS NOTES
OBSTETRIC HISTORY:   OB History          0    Para        Term                AB        Living             SAB        IAB        Ectopic        Multiple        Live Births                    COMPREHENSIVE GYN HISTORY:  PAP History: Reports abnormal Paps. Date:  Result: ASCUS +HRHPV Treatment: Repeat Pap  RECEIVED GARDASIL VACCINE  Infection History: Denies STDs. Denies PID.  Benign History: Denies uterine fibroids. Denies ovarian cysts. Denies endometriosis.   Cancer History: Denies cervical cancer. Denies uterine cancer or hyperplasia. Denies ovarian cancer. Denies vulvar cancer or pre-cancer. Denies vaginal cancer or pre-cancer. Denies breast cancer. Denies colon cancer.  Sexual Activity History:  reports that she currently engages in sexual activity. She reports using the following method of birth control/protection: OCP.   Menstrual History: Age of menarche: 13 years. Every 28 days, flows for 5 days. Moderate flow.  Dysmenorrhea History: Reports severe dysmenorrhea.       HPI:   29 y.o.  Patient's last menstrual period was 2024.     Patient is  here for pregnancy confirmation. There are no history of birth defects. She is having nausea no vomiting.  She denies bladder, breast and bowel complaints.    ROS:  GENERAL: No weight gain or weight loss.   CHEST: No shortness of breath.   ABDOMEN: No abdominal pain, constipation, diarrhea, vomiting or rectal bleeding.   URINARY: No frequency, dysuria, hematuria, or burning on urination.  REPRODUCTIVE: See HPI.   BREASTS: No breast pain, lumps, or nipple discharge.   PSYCHIATRIC: No depression or anxiety.        PE:   /87   Wt 76.3 kg (168 lb 2 oz)   LMP 2024   BMI 30.75 kg/m²   APPEARANCE: Well nourished, well developed, in no acute distress.  PELVIC:   VULVA: No lesions. Normal female genitalia.  URETHRAL MEATUS: Normal size and location, no lesions, no prolapse.  URETHRA: No masses, tenderness, prolapse or scarring.  VAGINA:  Moist and well rugated, no discharge, no significant cystocele or rectocele.  CERVIX: No lesions and discharge.  UTERUS: Normal size, regular shape, mobile, non-tender, bladder base nontender.  ADNEXA: No masses or tenderness.    PROCEDURES:  GC/CT  Urine cx  UPT    Assessment/Plan:  Encounter for confirmation of pregnancy  Pregnancy of unknown location      Patient was counseled today on routine precautions, including vaginal bleeding and abdominal pain. Maternal Quad screen offered - patient does desire screening.    Weight: We discussed proper weight gain based on the [place] [employer]'s recommendations based on her pre-pregnancy weight- see below.    Weight gain:     BMI 18-25:     25-35 lb    BMI 25-29:     15-25 lb    BMI >30:        11-20 lb     Diet: Avoid raw meat ie sushi, unpasteurized cheese, and heat up deli meat. Caffeine is safe up to 200 mg a day in the first trimester. Avoid fish that are high in mercury (blanca mackerel, swordfish, tuna) Limit fish to only 6-12 oz once a week. Limit [place] Tuna.  Environment: Patient also given environmental precautions such as avoiding cat litter and gardening without gloves. Discussed daily prenatal vitamin with folate/iron options (i.e. stool softener, DHA) and avoidance of smoking.   Regular and moderate exercise for 30 min or more per day with the avoidance of activities with a high risk of falling, prolonged supine positions, or abdominal trauma.  [place] is safe (unless advised otherwise)

## 2024-04-06 LAB
BACTERIA UR CULT: NORMAL
C TRACH DNA SPEC QL NAA+PROBE: NOT DETECTED
N GONORRHOEA DNA SPEC QL NAA+PROBE: NOT DETECTED

## 2024-04-08 ENCOUNTER — PATIENT MESSAGE (OUTPATIENT)
Dept: OBSTETRICS AND GYNECOLOGY | Facility: CLINIC | Age: 29
End: 2024-04-08
Payer: MEDICAID

## 2024-04-08 NOTE — TELEPHONE ENCOUNTER
Message sent to patient:     The ultrasound gave a due date of 11/15/24. Let us know if you would like to do the NIPT (non-invasive prenatal testing) so that the medical assistant can place the order at the .  Dr. Mcdonough

## 2024-04-17 ENCOUNTER — TELEPHONE (OUTPATIENT)
Dept: OBSTETRICS AND GYNECOLOGY | Facility: CLINIC | Age: 29
End: 2024-04-17
Payer: MEDICAID

## 2024-05-03 ENCOUNTER — TELEPHONE (OUTPATIENT)
Dept: OBSTETRICS AND GYNECOLOGY | Facility: CLINIC | Age: 29
End: 2024-05-03
Payer: MEDICAID

## 2024-05-10 ENCOUNTER — ROUTINE PRENATAL (OUTPATIENT)
Dept: OBSTETRICS AND GYNECOLOGY | Facility: CLINIC | Age: 29
End: 2024-05-10
Payer: MEDICAID

## 2024-05-10 VITALS
BODY MASS INDEX: 30.93 KG/M2 | SYSTOLIC BLOOD PRESSURE: 149 MMHG | WEIGHT: 169.06 LBS | DIASTOLIC BLOOD PRESSURE: 93 MMHG

## 2024-05-10 DIAGNOSIS — Z3A.13 13 WEEKS GESTATION OF PREGNANCY: Primary | ICD-10-CM

## 2024-05-10 DIAGNOSIS — O10.012 PRE-EXISTING ESSENTIAL HYPERTENSION DURING PREGNANCY IN SECOND TRIMESTER: ICD-10-CM

## 2024-05-10 LAB
BILIRUB SERPL-MCNC: NORMAL MG/DL
BLOOD URINE, POC: NORMAL
CLARITY, POC UA: CLEAR
COLOR, POC UA: YELLOW
GLUCOSE UR QL STRIP: NORMAL
KETONES UR QL STRIP: NORMAL
LEUKOCYTE ESTERASE URINE, POC: NORMAL
NITRITE, POC UA: NORMAL
PH, POC UA: 5
PROTEIN, POC: NORMAL
SPECIFIC GRAVITY, POC UA: 1.01
UROBILINOGEN, POC UA: NORMAL

## 2024-05-10 PROCEDURE — 99999 PR PBB SHADOW E&M-EST. PATIENT-LVL II: CPT | Mod: PBBFAC,,, | Performed by: OBSTETRICS & GYNECOLOGY

## 2024-05-10 PROCEDURE — 99213 OFFICE O/P EST LOW 20 MIN: CPT | Mod: TH,S$PBB,, | Performed by: OBSTETRICS & GYNECOLOGY

## 2024-05-10 PROCEDURE — 99999PBSHW POCT URINE DIPSTICK WITHOUT MICROSCOPE: Mod: PBBFAC,,,

## 2024-05-10 PROCEDURE — 81002 URINALYSIS NONAUTO W/O SCOPE: CPT | Mod: PBBFAC,PO | Performed by: OBSTETRICS & GYNECOLOGY

## 2024-05-10 PROCEDURE — 99212 OFFICE O/P EST SF 10 MIN: CPT | Mod: PBBFAC,TH,PO | Performed by: OBSTETRICS & GYNECOLOGY

## 2024-05-10 RX ORDER — ASPIRIN 81 MG/1
81 TABLET ORAL DAILY
COMMUNITY
Start: 2024-05-10

## 2024-05-10 NOTE — PROGRESS NOTES
HPI:   29 y.o.        Patient doing well. No vaginal bleeding or cramping noted. Discussed the need for an anatomy scan approximately at 20 weeks. Did MT21 will do AFP/labs at next visit. Wanted picture of US tried to print for patient. Elevated BP needs to start ASA 81mg and Connected MOM sign up. Discussed weight gain. Did take Tylenol sinus might also increase BP. Switch to Claritin and Flonase. Mucous membrane swelling is normal in pregnancy.     ROS:  GENERAL: Denies weight gain or weight loss. Feeling well overall.   HEAD: No headache.   ABDOMEN: No abdominal pain, constipation, diarrhea, nausea, vomiting.   URINARY: No frequency, dysuria, hematuria, or burning on urination.  EXTREMITIES: No swelling     Vitals signs, FHTs, urine dip, and PE findings documented, reviewed and available in OB flow chart.       I spent a total of 15 minutes on the day of the visit.This includes face to face time and non-face to face time preparing to see the patient (eg, review of tests), Obtaining and/or reviewing separately obtained history, Documenting clinical information in the electronic or other health record, Independently interpreting resultsand communicating results to the patient/family/caregiver, or Care coordination.           ASSESSMENT:  13w0d     PLAN:  RTO 3 weeks

## 2024-05-15 ENCOUNTER — PATIENT MESSAGE (OUTPATIENT)
Dept: ADMINISTRATIVE | Facility: OTHER | Age: 29
End: 2024-05-15
Payer: MEDICAID

## 2024-05-31 ENCOUNTER — PATIENT MESSAGE (OUTPATIENT)
Dept: RESEARCH | Facility: CLINIC | Age: 29
End: 2024-05-31
Payer: MEDICAID

## 2024-05-31 ENCOUNTER — PATIENT MESSAGE (OUTPATIENT)
Dept: OTHER | Facility: OTHER | Age: 29
End: 2024-05-31
Payer: MEDICAID

## 2024-06-03 ENCOUNTER — LAB VISIT (OUTPATIENT)
Dept: LAB | Facility: HOSPITAL | Age: 29
End: 2024-06-03
Attending: OBSTETRICS & GYNECOLOGY
Payer: MEDICAID

## 2024-06-03 ENCOUNTER — ROUTINE PRENATAL (OUTPATIENT)
Dept: OBSTETRICS AND GYNECOLOGY | Facility: CLINIC | Age: 29
End: 2024-06-03
Payer: MEDICAID

## 2024-06-03 VITALS
WEIGHT: 172.19 LBS | SYSTOLIC BLOOD PRESSURE: 118 MMHG | BODY MASS INDEX: 31.49 KG/M2 | DIASTOLIC BLOOD PRESSURE: 80 MMHG

## 2024-06-03 DIAGNOSIS — O10.012 PRE-EXISTING ESSENTIAL HYPERTENSION DURING PREGNANCY IN SECOND TRIMESTER: ICD-10-CM

## 2024-06-03 DIAGNOSIS — Z34.92 PRENATAL CARE IN SECOND TRIMESTER: ICD-10-CM

## 2024-06-03 DIAGNOSIS — Z36.3 ENCOUNTER FOR ANTENATAL SCREENING FOR MALFORMATION USING ULTRASOUND: ICD-10-CM

## 2024-06-03 DIAGNOSIS — Z34.92 PRENATAL CARE IN SECOND TRIMESTER: Primary | ICD-10-CM

## 2024-06-03 LAB
ABO + RH BLD: NORMAL
BILIRUB SERPL-MCNC: NORMAL MG/DL
BLD GP AB SCN CELLS X3 SERPL QL: NORMAL
BLOOD URINE, POC: NORMAL
CLARITY, POC UA: CLEAR
COLOR, POC UA: YELLOW
ERYTHROCYTE [DISTWIDTH] IN BLOOD BY AUTOMATED COUNT: 13.6 % (ref 11.5–14.5)
GLUCOSE UR QL STRIP: NORMAL
HBV SURFACE AG SERPL QL IA: NORMAL
HCT VFR BLD AUTO: 35.9 % (ref 37–48.5)
HCV AB SERPL QL IA: NORMAL
HGB BLD-MCNC: 12 G/DL (ref 12–16)
HIV 1+2 AB+HIV1 P24 AG SERPL QL IA: NORMAL
KETONES UR QL STRIP: NORMAL
LEUKOCYTE ESTERASE URINE, POC: NORMAL
MCH RBC QN AUTO: 27.1 PG (ref 27–31)
MCHC RBC AUTO-ENTMCNC: 33.4 G/DL (ref 32–36)
MCV RBC AUTO: 81 FL (ref 82–98)
NITRITE, POC UA: NORMAL
PH, POC UA: NORMAL
PLATELET # BLD AUTO: 242 K/UL (ref 150–450)
PMV BLD AUTO: 10.2 FL (ref 9.2–12.9)
PROTEIN, POC: NORMAL
RBC # BLD AUTO: 4.42 M/UL (ref 4–5.4)
SPECIFIC GRAVITY, POC UA: NORMAL
TREPONEMA PALLIDUM IGG+IGM AB [PRESENCE] IN SERUM OR PLASMA BY IMMUNOASSAY: NONREACTIVE
UROBILINOGEN, POC UA: NORMAL
WBC # BLD AUTO: 7.96 K/UL (ref 3.9–12.7)

## 2024-06-03 PROCEDURE — 86762 RUBELLA ANTIBODY: CPT | Performed by: OBSTETRICS & GYNECOLOGY

## 2024-06-03 PROCEDURE — 99212 OFFICE O/P EST SF 10 MIN: CPT | Mod: PBBFAC,TH,PO,25 | Performed by: OBSTETRICS & GYNECOLOGY

## 2024-06-03 PROCEDURE — 86593 SYPHILIS TEST NON-TREP QUANT: CPT | Performed by: OBSTETRICS & GYNECOLOGY

## 2024-06-03 PROCEDURE — 99999PBSHW POCT URINE DIPSTICK WITHOUT MICROSCOPE: Mod: PBBFAC,,,

## 2024-06-03 PROCEDURE — 81220 CFTR GENE COM VARIANTS: CPT | Performed by: OBSTETRICS & GYNECOLOGY

## 2024-06-03 PROCEDURE — 86901 BLOOD TYPING SEROLOGIC RH(D): CPT | Performed by: OBSTETRICS & GYNECOLOGY

## 2024-06-03 PROCEDURE — 87389 HIV-1 AG W/HIV-1&-2 AB AG IA: CPT | Performed by: OBSTETRICS & GYNECOLOGY

## 2024-06-03 PROCEDURE — 99999 PR PBB SHADOW E&M-EST. PATIENT-LVL II: CPT | Mod: PBBFAC,,, | Performed by: OBSTETRICS & GYNECOLOGY

## 2024-06-03 PROCEDURE — 99213 OFFICE O/P EST LOW 20 MIN: CPT | Mod: TH,S$PBB,, | Performed by: OBSTETRICS & GYNECOLOGY

## 2024-06-03 PROCEDURE — 86803 HEPATITIS C AB TEST: CPT | Performed by: OBSTETRICS & GYNECOLOGY

## 2024-06-03 PROCEDURE — 87340 HEPATITIS B SURFACE AG IA: CPT | Performed by: OBSTETRICS & GYNECOLOGY

## 2024-06-03 PROCEDURE — 82105 ALPHA-FETOPROTEIN SERUM: CPT | Performed by: OBSTETRICS & GYNECOLOGY

## 2024-06-03 PROCEDURE — 85027 COMPLETE CBC AUTOMATED: CPT | Performed by: OBSTETRICS & GYNECOLOGY

## 2024-06-03 PROCEDURE — 36415 COLL VENOUS BLD VENIPUNCTURE: CPT | Performed by: OBSTETRICS & GYNECOLOGY

## 2024-06-03 PROCEDURE — 81002 URINALYSIS NONAUTO W/O SCOPE: CPT | Mod: PBBFAC,PO | Performed by: OBSTETRICS & GYNECOLOGY

## 2024-06-03 NOTE — PROGRESS NOTES
HPI:   29 y.o.          Patient with no complaints--feels some cramping but now thinks it might be movement. Some elevated BP at home but she is doing at home prior to leaving for work when in a rush. Started ASADenies vaginal bleeding.  Patient DID NIPT needs AFP today. Anatomy scan scheduled.    ROS:  GENERAL: Denies weight gain or weight loss. Feeling well overall.   HEAD: No headache.   ABDOMEN: No abdominal pain, constipation, diarrhea, nausea, vomiting.   URINARY: No frequency, dysuria, hematuria, or burning on urination.  EXTREMITIES: No swelling     Vitals signs, FHTs, urine dip, and PE findings documented, reviewed and available in OB flow chart.       I spent a total of 15 minutes on the day of the visit.This includes face to face time and non-face to face time preparing to see the patient (eg, review of tests), Obtaining and/or reviewing separately obtained history, Documenting clinical information in the electronic or other health record, Independently interpreting resultsand communicating results to the patient/family/caregiver, or Care coordination.       ASSESSMENT:  16w3d  HTN  ASA started  OB labs + AFP    PLAN:  RTO 4 weeks + US anatomy

## 2024-06-04 LAB
RUBV IGG SER-ACNC: 52.9 IU/ML
RUBV IGG SER-IMP: REACTIVE

## 2024-06-06 LAB
# FETUSES US: NORMAL
AFP INTERPRETATION: NORMAL
AFP MOM SERPL: 1.38
AFP SERPL-MCNC: 43.8 NG/ML
AFP SERPL-MCNC: NEGATIVE NG/ML
AGE AT DELIVERY: 29
GA (DAYS): 3 D
GA (WEEKS): 16 WK
GESTATIONAL AGE METHOD: NORMAL
IDDM PATIENT QL: NORMAL
SMOKING STATUS FTND: NO

## 2024-06-07 ENCOUNTER — PATIENT MESSAGE (OUTPATIENT)
Dept: OTHER | Facility: OTHER | Age: 29
End: 2024-06-07
Payer: MEDICAID

## 2024-06-07 LAB — CFTR MUT ANL BLD/T: NORMAL

## 2024-06-28 ENCOUNTER — PATIENT MESSAGE (OUTPATIENT)
Dept: OTHER | Facility: OTHER | Age: 29
End: 2024-06-28
Payer: MEDICAID

## 2024-07-03 ENCOUNTER — PROCEDURE VISIT (OUTPATIENT)
Dept: MATERNAL FETAL MEDICINE | Facility: CLINIC | Age: 29
End: 2024-07-03
Payer: MEDICAID

## 2024-07-03 ENCOUNTER — ROUTINE PRENATAL (OUTPATIENT)
Dept: OBSTETRICS AND GYNECOLOGY | Facility: CLINIC | Age: 29
End: 2024-07-03
Payer: MEDICAID

## 2024-07-03 VITALS
DIASTOLIC BLOOD PRESSURE: 83 MMHG | SYSTOLIC BLOOD PRESSURE: 136 MMHG | WEIGHT: 185.38 LBS | BODY MASS INDEX: 33.91 KG/M2

## 2024-07-03 DIAGNOSIS — Z34.92 PRENATAL CARE IN SECOND TRIMESTER: Primary | ICD-10-CM

## 2024-07-03 DIAGNOSIS — Z36.3 ENCOUNTER FOR ANTENATAL SCREENING FOR MALFORMATION USING ULTRASOUND: ICD-10-CM

## 2024-07-03 LAB
BILIRUB SERPL-MCNC: NORMAL MG/DL
BLOOD URINE, POC: NORMAL
CLARITY, POC UA: CLEAR
COLOR, POC UA: YELLOW
GLUCOSE UR QL STRIP: 50
KETONES UR QL STRIP: NORMAL
LEUKOCYTE ESTERASE URINE, POC: NORMAL
NITRITE, POC UA: NORMAL
PH, POC UA: NORMAL
PROTEIN, POC: NORMAL
SPECIFIC GRAVITY, POC UA: NORMAL
UROBILINOGEN, POC UA: NORMAL

## 2024-07-03 PROCEDURE — 99213 OFFICE O/P EST LOW 20 MIN: CPT | Mod: TH,S$PBB,, | Performed by: OBSTETRICS & GYNECOLOGY

## 2024-07-03 PROCEDURE — 99999PBSHW POCT URINE DIPSTICK WITHOUT MICROSCOPE: Mod: PBBFAC,,,

## 2024-07-03 PROCEDURE — 81002 URINALYSIS NONAUTO W/O SCOPE: CPT | Mod: PBBFAC,PO | Performed by: OBSTETRICS & GYNECOLOGY

## 2024-07-03 PROCEDURE — 76805 OB US >/= 14 WKS SNGL FETUS: CPT | Mod: PBBFAC,PO | Performed by: OBSTETRICS & GYNECOLOGY

## 2024-07-03 PROCEDURE — 99999 PR PBB SHADOW E&M-EST. PATIENT-LVL II: CPT | Mod: PBBFAC,,, | Performed by: OBSTETRICS & GYNECOLOGY

## 2024-07-03 PROCEDURE — 99212 OFFICE O/P EST SF 10 MIN: CPT | Mod: PBBFAC,TH,PO | Performed by: OBSTETRICS & GYNECOLOGY

## 2024-07-03 NOTE — PROGRESS NOTES
HPI:   29 y.o.        Patient with no complaints. Having problems with Connected Mom. Denies vaginal bleeding or cramping.  Good FM. Ultrasound reviewed and consents signed. Discussed with patient having glucose testing for gestational diabetes preformed between 24-28 weeks.        ROS:  GENERAL: Denies weight gain or weight loss. Feeling well overall. +FM.   HEAD: No headache.   ABDOMEN: No abdominal pain, constipation, diarrhea, nausea, vomiting.   URINARY: No frequency, dysuria, hematuria, or burning on urination.  EXTREMITIES: No swelling     Vitals signs, FHTs, urine dip, and PE findings documented, reviewed and available in OB flow chart.       I spent a total of 15 minutes on the day of the visit.This includes face to face time and non-face to face time preparing to see the patient (eg, review of tests), Obtaining and/or reviewing separately obtained history, Documenting clinical information in the electronic or other health record, Independently interpreting resultsand communicating results to the patient/family/caregiver, or Care coordination.       ASSESSMENT:  20w5d  CHTN--no meds    PLAN:   RTO 4 weeks  Consents signed  Having problem with BP cuff connecting  Needs f/u US

## 2024-07-26 ENCOUNTER — PATIENT MESSAGE (OUTPATIENT)
Dept: OTHER | Facility: OTHER | Age: 29
End: 2024-07-26
Payer: MEDICAID

## 2024-07-30 ENCOUNTER — TELEPHONE (OUTPATIENT)
Dept: OBSTETRICS AND GYNECOLOGY | Facility: CLINIC | Age: 29
End: 2024-07-30
Payer: MEDICAID

## 2024-07-30 DIAGNOSIS — Z36.89 ENCOUNTER FOR ULTRASOUND TO ASSESS INTERVAL GROWTH OF FETUS: Primary | ICD-10-CM

## 2024-07-30 NOTE — TELEPHONE ENCOUNTER
Need standing viewpoint orders please  pt sched for thurs 8/1 for f/u olga lidia and tues 9/24 32wk growth for marginal pci   thanks

## 2024-08-01 ENCOUNTER — PROCEDURE VISIT (OUTPATIENT)
Dept: MATERNAL FETAL MEDICINE | Facility: CLINIC | Age: 29
End: 2024-08-01
Payer: MEDICAID

## 2024-08-01 ENCOUNTER — ROUTINE PRENATAL (OUTPATIENT)
Dept: OBSTETRICS AND GYNECOLOGY | Facility: CLINIC | Age: 29
End: 2024-08-01
Payer: MEDICAID

## 2024-08-01 VITALS
BODY MASS INDEX: 35.87 KG/M2 | DIASTOLIC BLOOD PRESSURE: 86 MMHG | SYSTOLIC BLOOD PRESSURE: 130 MMHG | WEIGHT: 196.13 LBS

## 2024-08-01 DIAGNOSIS — Z36.89 ENCOUNTER FOR ULTRASOUND TO ASSESS INTERVAL GROWTH OF FETUS: ICD-10-CM

## 2024-08-01 DIAGNOSIS — Z34.92 PRENATAL CARE IN SECOND TRIMESTER: Primary | ICD-10-CM

## 2024-08-01 LAB
BILIRUB SERPL-MCNC: NORMAL MG/DL
BLOOD URINE, POC: NORMAL
CLARITY, POC UA: CLEAR
COLOR, POC UA: YELLOW
GLUCOSE UR QL STRIP: NORMAL
KETONES UR QL STRIP: NORMAL
LEUKOCYTE ESTERASE URINE, POC: NORMAL
NITRITE, POC UA: NORMAL
PH, POC UA: 7
PROTEIN, POC: NORMAL
SPECIFIC GRAVITY, POC UA: 1.01
UROBILINOGEN, POC UA: 0.2

## 2024-08-01 PROCEDURE — 99999PBSHW POCT URINE DIPSTICK WITHOUT MICROSCOPE: Mod: PBBFAC,,,

## 2024-08-01 PROCEDURE — 99212 OFFICE O/P EST SF 10 MIN: CPT | Mod: PBBFAC,TH,PO,25 | Performed by: OBSTETRICS & GYNECOLOGY

## 2024-08-01 PROCEDURE — 76816 OB US FOLLOW-UP PER FETUS: CPT | Mod: PBBFAC,PO | Performed by: OBSTETRICS & GYNECOLOGY

## 2024-08-01 PROCEDURE — 99999 PR PBB SHADOW E&M-EST. PATIENT-LVL II: CPT | Mod: PBBFAC,,, | Performed by: OBSTETRICS & GYNECOLOGY

## 2024-08-01 PROCEDURE — 81002 URINALYSIS NONAUTO W/O SCOPE: CPT | Mod: PBBFAC,PO | Performed by: OBSTETRICS & GYNECOLOGY

## 2024-08-01 PROCEDURE — 99213 OFFICE O/P EST LOW 20 MIN: CPT | Mod: TH,S$PBB,, | Performed by: OBSTETRICS & GYNECOLOGY

## 2024-08-01 NOTE — PROGRESS NOTES
HPI:   29 y.o.         Patient with no complaints. Denies vaginal bleeding or contractions. Good FM. GTT/CBC ordered today.   labor precautions discussed with patient.    ROS:  GENERAL: Denies weight gain or weight loss. Feeling well overall. +FM. No LOF  HEAD: No headache.   ABDOMEN: No abdominal pain, constipation, diarrhea, nausea, vomiting.   URINARY: No frequency, dysuria, hematuria, or burning on urination.  EXTREMITIES: No swelling     Vitals signs, FHTs, urine dip, and PE findings documented, reviewed and available in OB flow chart.       I spent a total of 15 minutes on the day of the visit.This includes face to face time and non-face to face time preparing to see the patient (eg, review of tests), Obtaining and/or reviewing separately obtained history, Documenting clinical information in the electronic or other health record, Independently interpreting resultsand communicating results to the patient/family/caregiver, or Care coordination.       ASSESSMENT:  24w6d  Elevated BP (CHTN)  Discussed that I will no longer be doing OB--wants to think about options and see me one more time  OB labs ordered  Discussed breast feeding and breast pump    RTO 4 weeks

## 2024-08-09 ENCOUNTER — PATIENT MESSAGE (OUTPATIENT)
Dept: OTHER | Facility: OTHER | Age: 29
End: 2024-08-09
Payer: MEDICAID

## 2024-08-23 ENCOUNTER — PATIENT MESSAGE (OUTPATIENT)
Dept: OTHER | Facility: OTHER | Age: 29
End: 2024-08-23
Payer: MEDICAID

## 2024-08-27 ENCOUNTER — LAB VISIT (OUTPATIENT)
Dept: LAB | Facility: HOSPITAL | Age: 29
End: 2024-08-27
Attending: PEDIATRICS
Payer: MEDICAID

## 2024-08-27 ENCOUNTER — ROUTINE PRENATAL (OUTPATIENT)
Dept: OBSTETRICS AND GYNECOLOGY | Facility: CLINIC | Age: 29
End: 2024-08-27
Payer: MEDICAID

## 2024-08-27 VITALS
DIASTOLIC BLOOD PRESSURE: 88 MMHG | WEIGHT: 204.38 LBS | SYSTOLIC BLOOD PRESSURE: 135 MMHG | BODY MASS INDEX: 37.38 KG/M2

## 2024-08-27 DIAGNOSIS — Z34.92 PRENATAL CARE IN SECOND TRIMESTER: Primary | ICD-10-CM

## 2024-08-27 DIAGNOSIS — Z34.92 PRENATAL CARE IN SECOND TRIMESTER: ICD-10-CM

## 2024-08-27 LAB
BASOPHILS # BLD AUTO: 0.02 K/UL (ref 0–0.2)
BASOPHILS NFR BLD: 0.2 % (ref 0–1.9)
BILIRUB SERPL-MCNC: NORMAL MG/DL
BLOOD URINE, POC: NORMAL
CLARITY, POC UA: CLEAR
COLOR, POC UA: YELLOW
DIFFERENTIAL METHOD BLD: ABNORMAL
EOSINOPHIL # BLD AUTO: 0.2 K/UL (ref 0–0.5)
EOSINOPHIL NFR BLD: 2.1 % (ref 0–8)
ERYTHROCYTE [DISTWIDTH] IN BLOOD BY AUTOMATED COUNT: 14.4 % (ref 11.5–14.5)
GLUCOSE SERPL-MCNC: 234 MG/DL (ref 70–140)
GLUCOSE UR QL STRIP: NORMAL
HBV SURFACE AG SERPL QL IA: NORMAL
HCT VFR BLD AUTO: 31.5 % (ref 37–48.5)
HGB BLD-MCNC: 10.1 G/DL (ref 12–16)
HIV 1+2 AB+HIV1 P24 AG SERPL QL IA: NORMAL
IMM GRANULOCYTES # BLD AUTO: 0.05 K/UL (ref 0–0.04)
IMM GRANULOCYTES NFR BLD AUTO: 0.6 % (ref 0–0.5)
KETONES UR QL STRIP: NORMAL
LEUKOCYTE ESTERASE URINE, POC: NORMAL
LYMPHOCYTES # BLD AUTO: 1.3 K/UL (ref 1–4.8)
LYMPHOCYTES NFR BLD: 14.7 % (ref 18–48)
MCH RBC QN AUTO: 26.9 PG (ref 27–31)
MCHC RBC AUTO-ENTMCNC: 32.1 G/DL (ref 32–36)
MCV RBC AUTO: 84 FL (ref 82–98)
MONOCYTES # BLD AUTO: 0.3 K/UL (ref 0.3–1)
MONOCYTES NFR BLD: 3.8 % (ref 4–15)
NEUTROPHILS # BLD AUTO: 6.8 K/UL (ref 1.8–7.7)
NEUTROPHILS NFR BLD: 78.6 % (ref 38–73)
NITRITE, POC UA: NORMAL
NRBC BLD-RTO: 0 /100 WBC
PH, POC UA: 7
PLATELET # BLD AUTO: 176 K/UL (ref 150–450)
PMV BLD AUTO: 11.5 FL (ref 9.2–12.9)
PROTEIN, POC: NORMAL
RBC # BLD AUTO: 3.75 M/UL (ref 4–5.4)
SPECIFIC GRAVITY, POC UA: 1.02
TREPONEMA PALLIDUM IGG+IGM AB [PRESENCE] IN SERUM OR PLASMA BY IMMUNOASSAY: NONREACTIVE
UROBILINOGEN, POC UA: 0.2
WBC # BLD AUTO: 8.69 K/UL (ref 3.9–12.7)

## 2024-08-27 PROCEDURE — 81002 URINALYSIS NONAUTO W/O SCOPE: CPT | Mod: PBBFAC,PO | Performed by: OBSTETRICS & GYNECOLOGY

## 2024-08-27 PROCEDURE — 99213 OFFICE O/P EST LOW 20 MIN: CPT | Mod: TH,S$PBB,, | Performed by: OBSTETRICS & GYNECOLOGY

## 2024-08-27 PROCEDURE — 82950 GLUCOSE TEST: CPT | Performed by: OBSTETRICS & GYNECOLOGY

## 2024-08-27 PROCEDURE — 99999PBSHW POCT URINE DIPSTICK WITHOUT MICROSCOPE: Mod: PBBFAC,,,

## 2024-08-27 PROCEDURE — 36415 COLL VENOUS BLD VENIPUNCTURE: CPT | Performed by: OBSTETRICS & GYNECOLOGY

## 2024-08-27 PROCEDURE — 86593 SYPHILIS TEST NON-TREP QUANT: CPT | Performed by: OBSTETRICS & GYNECOLOGY

## 2024-08-27 PROCEDURE — 85025 COMPLETE CBC W/AUTO DIFF WBC: CPT | Performed by: OBSTETRICS & GYNECOLOGY

## 2024-08-27 PROCEDURE — 87340 HEPATITIS B SURFACE AG IA: CPT | Performed by: OBSTETRICS & GYNECOLOGY

## 2024-08-27 PROCEDURE — 99212 OFFICE O/P EST SF 10 MIN: CPT | Mod: PBBFAC,TH,PO | Performed by: OBSTETRICS & GYNECOLOGY

## 2024-08-27 PROCEDURE — 99999 PR PBB SHADOW E&M-EST. PATIENT-LVL II: CPT | Mod: PBBFAC,,, | Performed by: OBSTETRICS & GYNECOLOGY

## 2024-08-27 PROCEDURE — 87389 HIV-1 AG W/HIV-1&-2 AB AG IA: CPT | Performed by: OBSTETRICS & GYNECOLOGY

## 2024-08-27 NOTE — PROGRESS NOTES
HPI:   29 y.o.        Patient with no complaints. Did labs this morning. Denies vaginal bleeding or contractions. Good FM.    ROS:  GENERAL: Denies weight gain or weight loss. Feeling well overall. +FM. No LOF  HEAD: No headache.   ABDOMEN: No abdominal pain, constipation, diarrhea, nausea, vomiting.   URINARY: No frequency, dysuria, hematuria, or burning on urination.  EXTREMITIES: No swelling     Vitals signs, FHTs, urine dip, and PE findings documented, reviewed and available in OB flow chart.       I spent a total of 15 minutes on the day of the visit.This includes face to face time and non-face to face time preparing to see the patient (eg, review of tests), Obtaining and/or reviewing separately obtained history, Documenting clinical information in the electronic or other health record, Independently interpreting resultsand communicating results to the patient/family/caregiver, or Care coordination.       ASSESSMENT:  28w4d  Marginal cord has follow up US scheduled  ASA--taking for BP       RTO 2-3 weeks  Was informed last visit I would no longer be doing OB and she has chosen Dr. James to change to.

## 2024-08-28 ENCOUNTER — PATIENT MESSAGE (OUTPATIENT)
Dept: OBSTETRICS AND GYNECOLOGY | Facility: CLINIC | Age: 29
End: 2024-08-28
Payer: MEDICAID

## 2024-08-28 DIAGNOSIS — O99.810 ABNORMAL MATERNAL GLUCOSE TOLERANCE, ANTEPARTUM: Primary | ICD-10-CM

## 2024-09-03 ENCOUNTER — LAB VISIT (OUTPATIENT)
Dept: LAB | Facility: HOSPITAL | Age: 29
End: 2024-09-03
Attending: OBSTETRICS & GYNECOLOGY
Payer: MEDICAID

## 2024-09-03 DIAGNOSIS — O99.810 ABNORMAL MATERNAL GLUCOSE TOLERANCE, ANTEPARTUM: ICD-10-CM

## 2024-09-03 LAB
GLUCOSE SERPL-MCNC: 102 MG/DL (ref 70–110)
GLUCOSE SERPL-MCNC: 122 MG/DL
GLUCOSE SERPL-MCNC: 183 MG/DL
GLUCOSE SERPL-MCNC: 207 MG/DL

## 2024-09-03 PROCEDURE — 82951 GLUCOSE TOLERANCE TEST (GTT): CPT | Performed by: OBSTETRICS & GYNECOLOGY

## 2024-09-03 PROCEDURE — 82952 GTT-ADDED SAMPLES: CPT | Performed by: OBSTETRICS & GYNECOLOGY

## 2024-09-03 PROCEDURE — 36415 COLL VENOUS BLD VENIPUNCTURE: CPT | Performed by: OBSTETRICS & GYNECOLOGY

## 2024-09-04 ENCOUNTER — PATIENT MESSAGE (OUTPATIENT)
Dept: OBSTETRICS AND GYNECOLOGY | Facility: CLINIC | Age: 29
End: 2024-09-04
Payer: MEDICAID

## 2024-09-04 DIAGNOSIS — O24.410 DIET CONTROLLED GESTATIONAL DIABETES MELLITUS (GDM) IN THIRD TRIMESTER: Primary | ICD-10-CM

## 2024-09-06 ENCOUNTER — PATIENT MESSAGE (OUTPATIENT)
Dept: OTHER | Facility: OTHER | Age: 29
End: 2024-09-06
Payer: MEDICAID

## 2024-09-09 ENCOUNTER — CLINICAL SUPPORT (OUTPATIENT)
Dept: DIABETES | Facility: CLINIC | Age: 29
End: 2024-09-09
Payer: MEDICAID

## 2024-09-09 ENCOUNTER — PATIENT MESSAGE (OUTPATIENT)
Dept: DIABETES | Facility: CLINIC | Age: 29
End: 2024-09-09
Payer: MEDICAID

## 2024-09-09 DIAGNOSIS — O24.410 DIET CONTROLLED GESTATIONAL DIABETES MELLITUS (GDM) IN THIRD TRIMESTER: ICD-10-CM

## 2024-09-09 DIAGNOSIS — O24.410 DIET CONTROLLED GESTATIONAL DIABETES MELLITUS (GDM) IN THIRD TRIMESTER: Primary | ICD-10-CM

## 2024-09-09 PROCEDURE — 98960 EDU&TRN PT SELF-MGMT NQHP 1: CPT | Mod: 95,,, | Performed by: DIETITIAN, REGISTERED

## 2024-09-09 RX ORDER — LANCETS
1 EACH MISCELLANEOUS 4 TIMES DAILY
Qty: 150 EACH | Refills: 4 | Status: SHIPPED | OUTPATIENT
Start: 2024-09-09

## 2024-09-09 RX ORDER — INSULIN PUMP SYRINGE, 3 ML
EACH MISCELLANEOUS
Qty: 1 EACH | Refills: 0 | Status: SHIPPED | OUTPATIENT
Start: 2024-09-09 | End: 2025-09-09

## 2024-09-09 NOTE — PROGRESS NOTES
Diabetes Care Specialist Progress Note  Author: Josselyn Christie RD, CDE  Date: 9/9/2024    Diabetes Care Specialist Virtual Visit Note       The patient location is: home  The chief complaint leading to consultation is: Diabetes  Visit type: audiovisual  Total time spent with patient: 30 min   Each patient to whom he or she provides medical services by telemedicine is:  (1) informed of the relationship between the physician and patient and the respective role of any other health care provider with respect to management of the patient; and (2) notified that he or she may decline to receive medical services by telemedicine and may withdraw from such care at any time.    Intake    Program Intake  Reason for Diabetes Program Visit:: Initial Diabetes Assessment  Current diabetes risk level:: low  In the last 12 months, have you:: none    Current Diabetes Treatment: Diet/Exercise    Continuous Glucose Monitoring  Patient has CGM: No    Lifestyle Coping Support & Clinical    Lifestyle/Coping/Support  Does anyone in your family have diabetes or does anyone in your family support you in your diabetes care?: Yes  How do you deal with stress/distress?: Support from family/friends  Learning Barriers:: None  Culture or [employer] beliefs that may impact ability to access healthcare: No  Psychosocial/Coping Skills Assessment Completed: : Yes  Assessment indicates:: Adequate understanding  Area of need?: No    Diabetes Self-Management Skills Assessment    Medication Skills Assessment  Medication Skills Assessment Completed:: No  Deffered due to:: Other (comment) (Not on DM meds)  Area of need?: No    Diabetes Disease Process/Treatment Options  Diabetes Type?: Type II  When were you diagnosed?: 2024  What are your goals for this education session?: To learn how to manage GDM  Is patient aware of what causes diabetes?: No  Does patient understand the pathophysiology of diabetes?: No  Diabetes Disease Process/Treatment Options: Skills  Assessment Completed: Yes  Assessment indicates:: Instruction Needed  Area of need?: Yes    Nutrition/Healthy Eating - eats 3 meals plus snacks; drinks mostly water; was occasionally drinking sweet tea but cut out  Patient can identify foods that impact blood sugar.: yes  Nutrition/Healthy Eating Skills Assessment Completed:: Yes  Assessment indicates:: Instruction Needed  Area of need?: Yes    Physical Activity/Exercise  Patient's daily activity level:: moderately active  Patient formally exercises outside of work.: yes  Frequency: four or more times a week (30 minutes walking)  Patient can identify forms of physical activity.: yes  Physical Activity/Exercise Skills Assessment Completed: : Yes  Assessment indicates:: Instruction Needed  Area of need?: Yes    Home Blood Glucose Monitoring  Patient states that blood sugar is checked at home daily.: no  Home Blood Glucose Monitoring Skills Assessment Completed: : Yes  Assessment indicates:: Instruction Needed  Area of need?: Yes    Acute Complications  Have you ever had hypoglycemia (low BG 70 or less)?: no  Have you ever had hyperglycemia (high  or more)?: no  Acute Complications Skills Assessment Completed: : Yes  Assessment indicates:: Instruction Needed  Area of need?: Yes    Chronic Complications  Chronic Complications Skills Assessment Completed: : No  Deferred due to:: Other (comment) (GDM)  Area of need?: No      Assessment Summary and Plan    Based on today's diabetes care assessment, the following areas of need were identified:          9/9/2024    12:01 AM   Areas of Need   Medications/Current Diabetes Treatment No   Lifestyle Coping Support No   Diabetes Disease Process/Treatment Options Yes - reviewed GDM disease process and treatment options   Nutrition/Healthy Eating Yes - reviewed CHO counting, label reading and addt'l resources to assist w/ CHO counting; plate method reviewed; alternatives to sugary beverages discussed   Physical  Activity/Exercise Yes - reviewed goals/benefits   Home Blood Glucose Monitoring Yes - reviewed SMBG schedule and BG goals; Rx request sent to provider for testing supplies   Acute Complications Yes - reviewed possible complications of uncontrolled GDM   Chronic Complications No       Today's interventions were provided through individual discussion, instruction, and written materials were provided.      Patient verbalized understanding of instruction and written materials.  Pt was able to return back demonstration of instructions today. Patient understood key points, needs reinforcement and further instruction.     Diabetes Self-Management Care Plan:    Today's Diabetes Self-Management Care Plan was developed with Swetha's input. Swetha has agreed to work toward the following goal(s) to improve his/her overall diabetes control.      Care Plan: Diabetes Management   Updates made since 8/10/2024 12:00 AM        Problem: Blood Glucose Self-Monitoring         Goal: Patient agrees to check and record blood sugars 4 times per day.    Start Date: 9/9/2024   Expected End Date: 11/8/2024   Priority: High   Barriers: No Barriers Identified          Task: Reviewed the importance of self-monitoring blood glucose and keeping logs. Completed 9/9/2024          Follow Up Plan     Follow up in about 4 weeks (around 10/7/2024).    Today's care plan and follow up schedule was discussed with patient.  Swetha verbalized understanding of the care plan, goals, and agrees to follow up plan.        The patient was encouraged to communicate with his/her health care provider/physician and care team regarding his/her condition(s) and treatment.  I provided the patient with my contact information today and encouraged to contact me via phone or [facility] Patient Portal as needed.     Length of Visit   Total Time: 30 Minutes

## 2024-09-17 ENCOUNTER — HOSPITAL ENCOUNTER (OUTPATIENT)
Facility: HOSPITAL | Age: 29
Discharge: HOME | End: 2024-09-17
Attending: OBSTETRICS & GYNECOLOGY | Admitting: OBSTETRICS & GYNECOLOGY
Payer: MEDICAID

## 2024-09-17 ENCOUNTER — ROUTINE PRENATAL (OUTPATIENT)
Dept: OBSTETRICS AND GYNECOLOGY | Facility: CLINIC | Age: 29
End: 2024-09-17
Payer: MEDICAID

## 2024-09-17 VITALS — BODY MASS INDEX: 38.87 KG/M2 | DIASTOLIC BLOOD PRESSURE: 87 MMHG | WEIGHT: 212.5 LBS | SYSTOLIC BLOOD PRESSURE: 145 MMHG

## 2024-09-17 VITALS
OXYGEN SATURATION: 97 % | HEIGHT: 62 IN | BODY MASS INDEX: 39.11 KG/M2 | DIASTOLIC BLOOD PRESSURE: 75 MMHG | TEMPERATURE: 98 F | WEIGHT: 212.5 LBS | RESPIRATION RATE: 19 BRPM | SYSTOLIC BLOOD PRESSURE: 133 MMHG | HEART RATE: 94 BPM

## 2024-09-17 DIAGNOSIS — Z3A.31 31 WEEKS GESTATION OF PREGNANCY: Primary | ICD-10-CM

## 2024-09-17 LAB
ALBUMIN SERPL BCP-MCNC: 2.7 G/DL (ref 3.5–5.2)
ALP SERPL-CCNC: 117 U/L (ref 55–135)
ALT SERPL W/O P-5'-P-CCNC: 16 U/L (ref 10–44)
ANION GAP SERPL CALC-SCNC: 9 MMOL/L (ref 8–16)
AST SERPL-CCNC: 13 U/L (ref 10–40)
BASOPHILS # BLD AUTO: 0.02 K/UL (ref 0–0.2)
BASOPHILS NFR BLD: 0.2 % (ref 0–1.9)
BILIRUB SERPL-MCNC: 0.2 MG/DL (ref 0.1–1)
BILIRUB SERPL-MCNC: NEGATIVE MG/DL
BLOOD URINE, POC: NEGATIVE
BUN SERPL-MCNC: 7 MG/DL (ref 6–20)
CALCIUM SERPL-MCNC: 9.3 MG/DL (ref 8.7–10.5)
CHLORIDE SERPL-SCNC: 106 MMOL/L (ref 95–110)
CLARITY, POC UA: NORMAL
CO2 SERPL-SCNC: 24 MMOL/L (ref 23–29)
COLOR, POC UA: NORMAL
CREAT SERPL-MCNC: 0.7 MG/DL (ref 0.5–1.4)
CREAT UR-MCNC: 33 MG/DL (ref 15–325)
DIFFERENTIAL METHOD BLD: ABNORMAL
EOSINOPHIL # BLD AUTO: 0.2 K/UL (ref 0–0.5)
EOSINOPHIL NFR BLD: 2.2 % (ref 0–8)
ERYTHROCYTE [DISTWIDTH] IN BLOOD BY AUTOMATED COUNT: 14.3 % (ref 11.5–14.5)
EST. GFR  (NO RACE VARIABLE): >60 ML/MIN/1.73 M^2
GLUCOSE SERPL-MCNC: 121 MG/DL (ref 70–110)
GLUCOSE UR QL STRIP: NEGATIVE
HCT VFR BLD AUTO: 29.1 % (ref 37–48.5)
HGB BLD-MCNC: 9.6 G/DL (ref 12–16)
IMM GRANULOCYTES # BLD AUTO: 0.06 K/UL (ref 0–0.04)
IMM GRANULOCYTES NFR BLD AUTO: 0.7 % (ref 0–0.5)
KETONES UR QL STRIP: NEGATIVE
LEUKOCYTE ESTERASE URINE, POC: NORMAL
LYMPHOCYTES # BLD AUTO: 1.4 K/UL (ref 1–4.8)
LYMPHOCYTES NFR BLD: 15.6 % (ref 18–48)
MCH RBC QN AUTO: 27 PG (ref 27–31)
MCHC RBC AUTO-ENTMCNC: 33 G/DL (ref 32–36)
MCV RBC AUTO: 82 FL (ref 82–98)
MONOCYTES # BLD AUTO: 0.5 K/UL (ref 0.3–1)
MONOCYTES NFR BLD: 5.9 % (ref 4–15)
NEUTROPHILS # BLD AUTO: 6.9 K/UL (ref 1.8–7.7)
NEUTROPHILS NFR BLD: 75.4 % (ref 38–73)
NITRITE, POC UA: NEGATIVE
NRBC BLD-RTO: 0 /100 WBC
PH, POC UA: 7
PLATELET # BLD AUTO: 166 K/UL (ref 150–450)
PMV BLD AUTO: 11.2 FL (ref 9.2–12.9)
POTASSIUM SERPL-SCNC: 3.2 MMOL/L (ref 3.5–5.1)
PROT SERPL-MCNC: 6.4 G/DL (ref 6–8.4)
PROT UR-MCNC: <7 MG/DL (ref 0–15)
PROT/CREAT UR: NORMAL MG/G{CREAT} (ref 0–0.2)
PROTEIN, POC: NEGATIVE
RBC # BLD AUTO: 3.56 M/UL (ref 4–5.4)
SODIUM SERPL-SCNC: 139 MMOL/L (ref 136–145)
SPECIFIC GRAVITY, POC UA: 1.02
UROBILINOGEN, POC UA: 0.2
WBC # BLD AUTO: 9.14 K/UL (ref 3.9–12.7)

## 2024-09-17 PROCEDURE — 82570 ASSAY OF URINE CREATININE: CPT | Performed by: OBSTETRICS & GYNECOLOGY

## 2024-09-17 PROCEDURE — 81002 URINALYSIS NONAUTO W/O SCOPE: CPT | Mod: PBBFAC,PO | Performed by: OBSTETRICS & GYNECOLOGY

## 2024-09-17 PROCEDURE — 85025 COMPLETE CBC W/AUTO DIFF WBC: CPT | Performed by: OBSTETRICS & GYNECOLOGY

## 2024-09-17 PROCEDURE — 99213 OFFICE O/P EST LOW 20 MIN: CPT | Mod: PBBFAC,PO | Performed by: OBSTETRICS & GYNECOLOGY

## 2024-09-17 PROCEDURE — 99213 OFFICE O/P EST LOW 20 MIN: CPT | Mod: TH,S$PBB,, | Performed by: OBSTETRICS & GYNECOLOGY

## 2024-09-17 PROCEDURE — 99999PBSHW POCT URINE DIPSTICK WITHOUT MICROSCOPE: Mod: PBBFAC,,,

## 2024-09-17 PROCEDURE — 36415 COLL VENOUS BLD VENIPUNCTURE: CPT | Performed by: OBSTETRICS & GYNECOLOGY

## 2024-09-17 PROCEDURE — 59025 FETAL NON-STRESS TEST: CPT

## 2024-09-17 PROCEDURE — 80053 COMPREHEN METABOLIC PANEL: CPT | Performed by: OBSTETRICS & GYNECOLOGY

## 2024-09-17 PROCEDURE — 99999 PR PBB SHADOW E&M-EST. PATIENT-LVL III: CPT | Mod: PBBFAC,,, | Performed by: OBSTETRICS & GYNECOLOGY

## 2024-09-17 PROCEDURE — 99211 OFF/OP EST MAY X REQ PHY/QHP: CPT | Mod: 25,27

## 2024-09-17 RX ORDER — FERROUS SULFATE 325(65) MG
325 TABLET, DELAYED RELEASE (ENTERIC COATED) ORAL EVERY OTHER DAY
Qty: 60 TABLET | Refills: 3 | Status: SHIPPED | OUTPATIENT
Start: 2024-09-17

## 2024-09-17 NOTE — PROGRESS NOTES
Discharge instructions reviewed with patient. Verbalizes understanding. Discharged home for self care.

## 2024-09-17 NOTE — PROGRESS NOTES
No c/o today, transferring care from Dr. Mcdonough  Med: had had elevated BP in the past but no formal dx HTN  Surgery: no   FHT normal   FH normal   @ 31 4/7 wga   IOB labs   GDM: Discussed the diagnosis of gestational diabetes and the possibilty that she may have overt diabetes. Which we will retest for after she delivers. Discussed the risk of uncontrolled DM in pregnancy including stillbirth, macrosomia,   hypoglycemia. The first step in management is diet and exercise (30 minutes for 5 times a day).    Did DM education   Picking up glucometer today, log given    We discussed the possibility of medications either oral or insulin if blood sugars are still elevated despite diet and exercise. Will see back in 1 week to review blood sugar log book.   Set up for growth US   Possible HTN: has had elevated BP before pregnancy but never formally dx; had elevated BP at 13 wga but home log normal, BP elevated today x 3, going to start checking twice daily at home   Cont ASA   Will send to L&D for monitoring  Anemia: start iron    -[facility]

## 2024-09-17 NOTE — DISCHARGE INSTRUCTIONS
Prenatal Concerns and Teaching    Severe headache not relieved with a dose of tylenol   Blurry vision or seeing black spots  Sudden swelling in your face or hands  Sudden weight gain in only a few days   Severe stomach pains or cramps  Vomiting lasting more than 24 hours   Fever greater than 100.4 degrees  Vaginal bleeding that is more than just spotting   Excessive and unusual vaginal discharge   A gush or flow of water fluid from your vagina   Significant decrease or absence of baby's movement (starting at 24-36 weeks)   (less than 37 weeks): more than 4 contractions in an hour for 2 hours   Term (greater than 37 weeks): contractions every 3-5 minutes for 2 hours  Increase fluid intake to 8-10 glasses of water DAILY    FOLLOW UP WITH MD AS SCHEDULED     LABOR     What is  labor?   labor is labor that occurs before 37 completed weeks of pregnancy.  Your baby could be born to early and have serious health problems.    Will you have  labor?   labor can happen to any woman, but some women are at greater risk than others.  A woman is more likely to have  labor if she:  Is pregnant with twins or more  Had a premature birth (before 37 completed weeks of pregnancy), in another pregnancy  Has certain problems of the uterus or cervix    Call your health care provider or go to the hospital right away if you have any of these warning signs:  Contractions that make your belly tighten up like a fist every 10 minutes or more often  Change in the color of your vaginal discharge, or bleeding from your vagina  The feeling that your baby is pushing down.  This is called pelvic pressure  Low, dull backache  Cramps that feel like your period  Belly cramps with or without diarrhea      Home Care:   It can help to drink plenty of water and take warm baths. Do what you can ahead of time to prepare for giving birth so youll have less to worry about later.   Keep a record of your  contractions. Write down what time each one starts and how long it lasts. A stopwatch is helpful. Look for the pattern of regularly spaced out contractions with a gradual increase in the time each one lasts.   Dont be embarrassed about going to the hospital with a false alarm.     More about contractions    When any muscle in your body contracts, it becomes tight or hard to the touch.  Your uterus is a muscle.  When it contracts, you will feel it tighten like a fist.  A contraction doesn't have to be painful.  It may feel like cramping or lower back pain.  When the contraction stops, your uterus becomes soft again.  It's normal for your uterus to contract at times during pregnancy.  This may happen when you first lie down, after sex, or after you walk up and down stairs.  It is not normal to have regular, frequent contractions before your baby is due.  If you feel a contraction every 10 minutes or more often during 1 hour (more than five contractions in an hour), then your uterus is anders too much.  Call your health care provider if this happens.         GOING THE FULL 40 WEEKS      More and more births are being scheduled a little early for non-medical reasons.  Experts are learning that this can cause problems for both mom and baby.  If your pregnancy is healthy, wait for labor to begin on its own.    If your pregnancy is healthy and you are planning to schedule your baby's birth, its best to stay pregnant for at least 39 weeks.  Babies born too early may have more health problems at birth and later in life than babies born later.  Being pregnant 39 weeks gives your baby's body all the time it needs to grow.    Here's why your baby needs 39 weeks:    Important organs, like his brain, lungs and liver, get all the time they need to develop  He/she is less likely to have vision and hearing problems after birth  He/she has time to gain more weight in the womb.  Babies born a healthy weight have an easier time  staying warm than babies born too small  He/she can suck and swallow and stay awake long to eat after he/she's born.  Babies born early sometimes can't do these things.    KICK COUNTS    Its normal to worry about your babys health. One way you can know your babys doing well is to record the babys movements once a day. This is called a kick count. You will usually feel your baby move by the 20th week of pregnancy. Remember to take your kick count records to all your appointments with your healthcare provider.     How to Count Kicks   Choose a time when the baby is active, such as after a meal.   Sit comfortably or lie on your side.   The first time the baby moves, write down the time.   Count each movement until the baby has moved 10 times. This can take from 20 minutes to 2 hours.   If the baby hasnt moved 4 times in 1 hour, pat your stomach to wake the baby up.   Write down the time you feel the babys 10th movement.   Try to do it at the same time each day.    When to Call Your Healthcare Provider     Call your healthcare provider right away if you notice any of the following:   Your baby moves fewer than 10 times in 4 hours while youre doing kick counts.   Your baby moves much less often than on the days before.   You have not felt your baby move all day.      TIME NUMBER OF KICKS                                                  Over the Counter Medications Safe to Take During Pregnancy       Pain Relief   Tylenol or acetaminophen (plain/extra strength)   Caution: Do not take aspirin (Anacin, Michael) or ibuprofen (Advil, Motrin).     Medicine for Digestive Upsets   Antacid (Tums, Rolaids, Mylanta, Maalox, Pepcid, Prevacid)   Simethicone (Gas-X, Mylicon for gas pain, Gaviscon)   Imodium or BRAT diet (bananas, rice, applesauce, toast or tea) for diarrhea   Medicine for Coughs/Colds   Guaifenesin (Robitussin)   Guaifenesin plus dextromethorphan (Robitussin-DM)   Cough Drops   Vicks VapoRub   Acetaminophen    Allergy Relief   Tylenol or acetaminophen (plain/extra strength) is ok   Chlorpheniramine antihistamine alone (chlor-Trimetron)   Benadryl tablets   Saline nasal spray   Neti-pot or sinus rinse   Claritin, Zyrtec, Allegra   Options for Constipation   Fiber can be used regularly (Metamucil, MiraLax, Citrucel, BeneFiber)   Laxatives can be used occasionally (Colace, Dulcolax)   Tucks for hemorrhoids   Mix of Prune Juice, OJ, 7UP- equal parts

## 2024-09-17 NOTE — NURSING
Pt presents to labor and delivery from the clinic to rule out pre-eclampsia. Pt denies any headaches, blurry vision, or right upper quadrant pain. She also denies contractions, vaginal bleeding, or leaking of vaginal fluids. Dr. James aware of patients arrival. Labs ordered. External fetal monitor applied. FHR around 135bpm. Moderate variability. +accels. -decels. No contractions noted at this time. Bed in lowest position, call light within reach. Patient safety maintained.

## 2024-09-20 ENCOUNTER — PATIENT MESSAGE (OUTPATIENT)
Dept: OTHER | Facility: OTHER | Age: 29
End: 2024-09-20
Payer: MEDICAID

## 2024-09-24 ENCOUNTER — PROCEDURE VISIT (OUTPATIENT)
Dept: MATERNAL FETAL MEDICINE | Facility: CLINIC | Age: 29
End: 2024-09-24
Payer: MEDICAID

## 2024-09-24 DIAGNOSIS — Z36.89 ENCOUNTER FOR ULTRASOUND TO ASSESS INTERVAL GROWTH OF FETUS: ICD-10-CM

## 2024-09-24 PROCEDURE — 76816 OB US FOLLOW-UP PER FETUS: CPT | Mod: PBBFAC,PO | Performed by: STUDENT IN AN ORGANIZED HEALTH CARE EDUCATION/TRAINING PROGRAM

## 2024-09-25 ENCOUNTER — TELEPHONE (OUTPATIENT)
Dept: OBSTETRICS AND GYNECOLOGY | Facility: HOSPITAL | Age: 29
End: 2024-09-25
Payer: MEDICAID

## 2024-09-25 ENCOUNTER — ROUTINE PRENATAL (OUTPATIENT)
Dept: OBSTETRICS AND GYNECOLOGY | Facility: CLINIC | Age: 29
End: 2024-09-25
Payer: MEDICAID

## 2024-09-25 VITALS
SYSTOLIC BLOOD PRESSURE: 136 MMHG | DIASTOLIC BLOOD PRESSURE: 92 MMHG | WEIGHT: 213.63 LBS | HEART RATE: 98 BPM | BODY MASS INDEX: 39.07 KG/M2

## 2024-09-25 DIAGNOSIS — O10.012 PRE-EXISTING ESSENTIAL HYPERTENSION DURING PREGNANCY IN SECOND TRIMESTER: ICD-10-CM

## 2024-09-25 DIAGNOSIS — Z3A.32 32 WEEKS GESTATION OF PREGNANCY: Primary | ICD-10-CM

## 2024-09-25 DIAGNOSIS — O24.410 DIET CONTROLLED GESTATIONAL DIABETES MELLITUS (GDM) IN THIRD TRIMESTER: ICD-10-CM

## 2024-09-25 LAB
BILIRUB SERPL-MCNC: ABNORMAL MG/DL
BLOOD URINE, POC: ABNORMAL
CLARITY, POC UA: CLEAR
COLOR, POC UA: YELLOW
CREAT UR-MCNC: 32 MG/DL (ref 15–325)
GLUCOSE UR QL STRIP: ABNORMAL
KETONES UR QL STRIP: ABNORMAL
LEUKOCYTE ESTERASE URINE, POC: ABNORMAL
NITRITE, POC UA: ABNORMAL
PH, POC UA: 7
PROT UR-MCNC: <7 MG/DL (ref 0–15)
PROT/CREAT UR: NORMAL MG/G{CREAT} (ref 0–0.2)
PROTEIN, POC: ABNORMAL
SPECIFIC GRAVITY, POC UA: 1.02
UROBILINOGEN, POC UA: 0.2

## 2024-09-25 PROCEDURE — 81002 URINALYSIS NONAUTO W/O SCOPE: CPT | Mod: PBBFAC,PO | Performed by: OBSTETRICS & GYNECOLOGY

## 2024-09-25 PROCEDURE — 99999PBSHW POCT URINE DIPSTICK WITHOUT MICROSCOPE: Mod: PBBFAC,,,

## 2024-09-25 PROCEDURE — 99213 OFFICE O/P EST LOW 20 MIN: CPT | Mod: PBBFAC,TH,PO | Performed by: OBSTETRICS & GYNECOLOGY

## 2024-09-25 PROCEDURE — 99999 PR PBB SHADOW E&M-EST. PATIENT-LVL III: CPT | Mod: PBBFAC,,, | Performed by: OBSTETRICS & GYNECOLOGY

## 2024-09-25 PROCEDURE — 84156 ASSAY OF PROTEIN URINE: CPT | Performed by: OBSTETRICS & GYNECOLOGY

## 2024-09-25 RX ORDER — CYCLOBENZAPRINE HCL 5 MG
5 TABLET ORAL 3 TIMES DAILY PRN
Qty: 5 TABLET | Refills: 0 | Status: SHIPPED | OUTPATIENT
Start: 2024-09-25 | End: 2024-10-05

## 2024-09-25 RX ORDER — METFORMIN HYDROCHLORIDE 500 MG/1
500 TABLET ORAL NIGHTLY
Qty: 90 TABLET | Refills: 3 | Status: SHIPPED | OUTPATIENT
Start: 2024-09-25 | End: 2025-09-25

## 2024-09-25 RX ORDER — LABETALOL 100 MG/1
100 TABLET, FILM COATED ORAL 2 TIMES DAILY
Qty: 60 TABLET | Refills: 11 | Status: SHIPPED | OUTPATIENT
Start: 2024-09-25 | End: 2025-09-25

## 2024-09-30 ENCOUNTER — PROCEDURE VISIT (OUTPATIENT)
Dept: MATERNAL FETAL MEDICINE | Facility: CLINIC | Age: 29
End: 2024-09-30
Payer: MEDICAID

## 2024-09-30 DIAGNOSIS — O10.012 PRE-EXISTING ESSENTIAL HYPERTENSION DURING PREGNANCY IN SECOND TRIMESTER: ICD-10-CM

## 2024-09-30 DIAGNOSIS — O24.410 DIET CONTROLLED GESTATIONAL DIABETES MELLITUS (GDM) IN THIRD TRIMESTER: ICD-10-CM

## 2024-09-30 PROCEDURE — 76819 FETAL BIOPHYS PROFIL W/O NST: CPT | Mod: PBBFAC,PO | Performed by: STUDENT IN AN ORGANIZED HEALTH CARE EDUCATION/TRAINING PROGRAM

## 2024-10-04 ENCOUNTER — HOSPITAL ENCOUNTER (OUTPATIENT)
Dept: OBSTETRICS AND GYNECOLOGY | Facility: HOSPITAL | Age: 29
Discharge: HOME | End: 2024-10-04
Attending: OBSTETRICS & GYNECOLOGY
Payer: MEDICAID

## 2024-10-04 VITALS
HEART RATE: 84 BPM | DIASTOLIC BLOOD PRESSURE: 82 MMHG | RESPIRATION RATE: 19 BRPM | SYSTOLIC BLOOD PRESSURE: 129 MMHG | OXYGEN SATURATION: 98 %

## 2024-10-04 DIAGNOSIS — O24.415 GESTATIONAL DIABETES MELLITUS (GDM) IN THIRD TRIMESTER CONTROLLED ON ORAL HYPOGLYCEMIC DRUG: Primary | ICD-10-CM

## 2024-10-04 PROCEDURE — 59025 FETAL NON-STRESS TEST: CPT

## 2024-10-07 ENCOUNTER — PROCEDURE VISIT (OUTPATIENT)
Dept: MATERNAL FETAL MEDICINE | Facility: CLINIC | Age: 29
End: 2024-10-07
Payer: MEDICAID

## 2024-10-07 DIAGNOSIS — O24.410 DIET CONTROLLED GESTATIONAL DIABETES MELLITUS (GDM) IN THIRD TRIMESTER: ICD-10-CM

## 2024-10-07 DIAGNOSIS — O10.012 PRE-EXISTING ESSENTIAL HYPERTENSION DURING PREGNANCY IN SECOND TRIMESTER: ICD-10-CM

## 2024-10-07 PROCEDURE — 76819 FETAL BIOPHYS PROFIL W/O NST: CPT | Mod: PBBFAC,PO | Performed by: OBSTETRICS & GYNECOLOGY

## 2024-10-11 ENCOUNTER — HOSPITAL ENCOUNTER (OUTPATIENT)
Dept: OBSTETRICS AND GYNECOLOGY | Facility: HOSPITAL | Age: 29
Discharge: HOME | End: 2024-10-11
Attending: PEDIATRICS
Payer: MEDICAID

## 2024-10-11 ENCOUNTER — HOSPITAL ENCOUNTER (OUTPATIENT)
Facility: HOSPITAL | Age: 29
Discharge: HOME | End: 2024-10-11
Attending: OBSTETRICS & GYNECOLOGY | Admitting: OBSTETRICS & GYNECOLOGY
Payer: MEDICAID

## 2024-10-11 ENCOUNTER — ROUTINE PRENATAL (OUTPATIENT)
Dept: OBSTETRICS AND GYNECOLOGY | Facility: CLINIC | Age: 29
End: 2024-10-11
Payer: MEDICAID

## 2024-10-11 ENCOUNTER — PATIENT MESSAGE (OUTPATIENT)
Dept: OTHER | Facility: OTHER | Age: 29
End: 2024-10-11
Payer: MEDICAID

## 2024-10-11 VITALS — SYSTOLIC BLOOD PRESSURE: 133 MMHG | DIASTOLIC BLOOD PRESSURE: 86 MMHG | OXYGEN SATURATION: 100 % | HEART RATE: 80 BPM

## 2024-10-11 VITALS
WEIGHT: 217.63 LBS | DIASTOLIC BLOOD PRESSURE: 84 MMHG | OXYGEN SATURATION: 98 % | HEART RATE: 81 BPM | SYSTOLIC BLOOD PRESSURE: 136 MMHG | RESPIRATION RATE: 19 BRPM | BODY MASS INDEX: 39.8 KG/M2 | SYSTOLIC BLOOD PRESSURE: 148 MMHG | DIASTOLIC BLOOD PRESSURE: 105 MMHG

## 2024-10-11 DIAGNOSIS — R06.83 SNORING: ICD-10-CM

## 2024-10-11 DIAGNOSIS — I10 HYPERTENSION, UNSPECIFIED TYPE: ICD-10-CM

## 2024-10-11 DIAGNOSIS — Z3A.35 35 WEEKS GESTATION OF PREGNANCY: ICD-10-CM

## 2024-10-11 DIAGNOSIS — Z3A.35 35 WEEKS GESTATION OF PREGNANCY: Primary | ICD-10-CM

## 2024-10-11 DIAGNOSIS — O24.415 GESTATIONAL DIABETES MELLITUS (GDM) IN THIRD TRIMESTER CONTROLLED ON ORAL HYPOGLYCEMIC DRUG: ICD-10-CM

## 2024-10-11 LAB
ALBUMIN SERPL BCP-MCNC: 2.8 G/DL (ref 3.5–5.2)
ALP SERPL-CCNC: 134 U/L (ref 55–135)
ALT SERPL W/O P-5'-P-CCNC: 12 U/L (ref 10–44)
ANION GAP SERPL CALC-SCNC: 11 MMOL/L (ref 8–16)
AST SERPL-CCNC: 16 U/L (ref 10–40)
BASOPHILS # BLD AUTO: 0.01 K/UL (ref 0–0.2)
BASOPHILS NFR BLD: 0.1 % (ref 0–1.9)
BILIRUB SERPL-MCNC: 0.2 MG/DL (ref 0.1–1)
BILIRUB SERPL-MCNC: ABNORMAL MG/DL
BLOOD URINE, POC: ABNORMAL
BUN SERPL-MCNC: 7 MG/DL (ref 6–20)
CALCIUM SERPL-MCNC: 8.9 MG/DL (ref 8.7–10.5)
CHLORIDE SERPL-SCNC: 107 MMOL/L (ref 95–110)
CLARITY, POC UA: CLEAR
CO2 SERPL-SCNC: 19 MMOL/L (ref 23–29)
COLOR, POC UA: YELLOW
CREAT SERPL-MCNC: 0.7 MG/DL (ref 0.5–1.4)
CREAT UR-MCNC: 99.1 MG/DL (ref 15–325)
CREAT UR-MCNC: 99.1 MG/DL (ref 15–325)
DIFFERENTIAL METHOD BLD: ABNORMAL
EOSINOPHIL # BLD AUTO: 0.2 K/UL (ref 0–0.5)
EOSINOPHIL NFR BLD: 2 % (ref 0–8)
ERYTHROCYTE [DISTWIDTH] IN BLOOD BY AUTOMATED COUNT: 15 % (ref 11.5–14.5)
EST. GFR  (NO RACE VARIABLE): >60 ML/MIN/1.73 M^2
GLUCOSE SERPL-MCNC: 91 MG/DL (ref 70–110)
GLUCOSE UR QL STRIP: ABNORMAL
HCT VFR BLD AUTO: 29.6 % (ref 37–48.5)
HGB BLD-MCNC: 9 G/DL (ref 12–16)
IMM GRANULOCYTES # BLD AUTO: 0.04 K/UL (ref 0–0.04)
IMM GRANULOCYTES NFR BLD AUTO: 0.5 % (ref 0–0.5)
KETONES UR QL STRIP: ABNORMAL
LEUKOCYTE ESTERASE URINE, POC: ABNORMAL
LYMPHOCYTES # BLD AUTO: 1.2 K/UL (ref 1–4.8)
LYMPHOCYTES NFR BLD: 15 % (ref 18–48)
MCH RBC QN AUTO: 25.6 PG (ref 27–31)
MCHC RBC AUTO-ENTMCNC: 30.4 G/DL (ref 32–36)
MCV RBC AUTO: 84 FL (ref 82–98)
MONOCYTES # BLD AUTO: 0.4 K/UL (ref 0.3–1)
MONOCYTES NFR BLD: 5.1 % (ref 4–15)
NEUTROPHILS # BLD AUTO: 6.2 K/UL (ref 1.8–7.7)
NEUTROPHILS NFR BLD: 77.3 % (ref 38–73)
NITRITE, POC UA: ABNORMAL
NRBC BLD-RTO: 0 /100 WBC
PH, POC UA: 7
PLATELET # BLD AUTO: 160 K/UL (ref 150–450)
PMV BLD AUTO: 12.4 FL (ref 9.2–12.9)
POTASSIUM SERPL-SCNC: 3.5 MMOL/L (ref 3.5–5.1)
PROT SERPL-MCNC: 6.4 G/DL (ref 6–8.4)
PROT UR-MCNC: 18 MG/DL (ref 0–15)
PROT UR-MCNC: 18 MG/DL (ref 0–15)
PROT/CREAT UR: 0.18 MG/G{CREAT} (ref 0–0.2)
PROT/CREAT UR: 0.18 MG/G{CREAT} (ref 0–0.2)
PROTEIN, POC: ABNORMAL
RBC # BLD AUTO: 3.52 M/UL (ref 4–5.4)
SODIUM SERPL-SCNC: 137 MMOL/L (ref 136–145)
SPECIFIC GRAVITY, POC UA: 1.02
UROBILINOGEN, POC UA: 0.2
WBC # BLD AUTO: 8.06 K/UL (ref 3.9–12.7)

## 2024-10-11 PROCEDURE — 85025 COMPLETE CBC W/AUTO DIFF WBC: CPT | Performed by: OBSTETRICS & GYNECOLOGY

## 2024-10-11 PROCEDURE — 59025 FETAL NON-STRESS TEST: CPT | Mod: 76

## 2024-10-11 PROCEDURE — 59025 FETAL NON-STRESS TEST: CPT

## 2024-10-11 PROCEDURE — 63600175 PHARM REV CODE 636 W HCPCS: Performed by: OBSTETRICS & GYNECOLOGY

## 2024-10-11 PROCEDURE — 87653 STREP B DNA AMP PROBE: CPT | Performed by: OBSTETRICS & GYNECOLOGY

## 2024-10-11 PROCEDURE — 99211 OFF/OP EST MAY X REQ PHY/QHP: CPT | Mod: 25,27

## 2024-10-11 PROCEDURE — 96372 THER/PROPH/DIAG INJ SC/IM: CPT

## 2024-10-11 PROCEDURE — 80053 COMPREHEN METABOLIC PANEL: CPT | Performed by: OBSTETRICS & GYNECOLOGY

## 2024-10-11 PROCEDURE — 84156 ASSAY OF PROTEIN URINE: CPT | Performed by: OBSTETRICS & GYNECOLOGY

## 2024-10-11 PROCEDURE — 99999 PR PBB SHADOW E&M-EST. PATIENT-LVL III: CPT | Mod: PBBFAC,,, | Performed by: OBSTETRICS & GYNECOLOGY

## 2024-10-11 PROCEDURE — 99213 OFFICE O/P EST LOW 20 MIN: CPT | Mod: PBBFAC,25,27,TH,PO | Performed by: OBSTETRICS & GYNECOLOGY

## 2024-10-11 PROCEDURE — 36415 COLL VENOUS BLD VENIPUNCTURE: CPT | Performed by: OBSTETRICS & GYNECOLOGY

## 2024-10-11 RX ORDER — ACETAMINOPHEN 500 MG
500 TABLET ORAL EVERY 6 HOURS PRN
Status: CANCELLED | OUTPATIENT
Start: 2024-10-11

## 2024-10-11 RX ORDER — ACETAMINOPHEN 500 MG
500 TABLET ORAL EVERY 6 HOURS PRN
Status: DISCONTINUED | OUTPATIENT
Start: 2024-10-11 | End: 2024-10-11 | Stop reason: HOSPADM

## 2024-10-11 RX ORDER — LABETALOL 200 MG/1
TABLET, FILM COATED ORAL
Qty: 60 TABLET | Refills: 11 | Status: SHIPPED | OUTPATIENT
Start: 2024-10-11

## 2024-10-11 RX ORDER — ONDANSETRON 8 MG/1
8 TABLET, ORALLY DISINTEGRATING ORAL EVERY 8 HOURS PRN
Status: CANCELLED | OUTPATIENT
Start: 2024-10-11

## 2024-10-11 RX ORDER — ONDANSETRON 8 MG/1
8 TABLET, ORALLY DISINTEGRATING ORAL EVERY 8 HOURS PRN
Status: DISCONTINUED | OUTPATIENT
Start: 2024-10-11 | End: 2024-10-11 | Stop reason: HOSPADM

## 2024-10-11 RX ORDER — BETAMETHASONE SODIUM PHOSPHATE AND BETAMETHASONE ACETATE 3; 3 MG/ML; MG/ML
12 INJECTION, SUSPENSION INTRA-ARTICULAR; INTRALESIONAL; INTRAMUSCULAR; SOFT TISSUE
Status: DISCONTINUED | OUTPATIENT
Start: 2024-10-11 | End: 2024-10-11 | Stop reason: HOSPADM

## 2024-10-11 RX ADMIN — BETAMETHASONE SODIUM PHOSPHATE AND BETAMETHASONE ACETATE 12 MG: 3; 3 INJECTION, SUSPENSION INTRA-ARTICULAR; INTRALESIONAL; INTRAMUSCULAR at 12:10

## 2024-10-11 NOTE — LETTER
October 11, 2024      [facility]  [address]  [address]  [place] [address]  Phone: [phone]       Patient: Swetha Lomeli   YOB: 1995  Date of Visit: 10/11/2024    To Whom It May Concern:    Justin Lomeli  was at [facility] on 10/11/2024. Should start her leave on 10/21/24 due to complications in pregnancy and anticipation she will need to deliver earlier.     Sincerely,    Fariba James MD

## 2024-10-11 NOTE — DISCHARGE INSTRUCTIONS
Return to [facility] tomorrow, Saturday 10/12 for second dose of betamethasone at 1:00 pm. And Fetal Non-Stress test.

## 2024-10-11 NOTE — NURSING
Patient discharged after being seen by Dr James. Patient was normotensive in triage. VSS. Patient reports consistently taking prescribed 100mg Labetalol. Patient denies pain at time of discharge.    Patient received first dose of betamethasone at 1300 in right dorso-gluteal. Patient instructed to return to unit in 24 hours for second dose and 20 minute NST. Patient verbalized understanding of instructions.     NST was reassuring. , moderate variability. No contractions noted from TOCO monitor.

## 2024-10-11 NOTE — NURSING
Swetha Lomeli is a  at 35weeks sent over from OB clinic by Dr James for PreE work up.    C/o htn, previous headache. Patient presents to L&D c./o no headache currently, denies visual changes, n/v/d, chest pain, epigastric pain, or new swelling.     Urine sample collected and sent to laboratory for P/c ratio.    BP series initiated: 134/69, 128/73     NST initiated  with moderate variability. No uterine ctx noted.     Will update Dr James with BP series, CBC, CMP, and P/C ratio results.

## 2024-10-11 NOTE — PROGRESS NOTES
Having swelling that is significant. Feeling like something caught in her throat sometimes and does notice she is snoring more. Had a HA for 2 days but that resolved  BP elevated here and on her connected moms, normal on her NST    FHT normal   FH normal   Cervix ft//th   GBS done   @ 35 0/7 wga   IOB labs  Anatomy done    MT21 negative   3rd trim labs done     GDM:   Did DM education   Set up for growth US Q4 and BPP weekly  Metformin 500 QHS, 1/2 fasting elevated, only 1-2 after meals, will go up to 1000 QHS  HTN: has had elevated BP before pregnancy but never formally dx;   Cont ASA and  on labetalol 100 BID, will go up to 300 BID  Will send to L&D for monitoring and labs, discussed BMZ and she will think about it   IOL most likely 37-38 wga since BP getting harder to control on baseline medications with GDM   Anemia: iron  Marginal cord insertion       Growth US: 9/24/24 28%, 2022gm, 4lb 7 oz, vertex     -Dr. Shweta Carlton [facility]   -discussed RSV vaccine and is going to see if baby can get it, if not she would like to get it.     Face to Face time with patient: 35 minutes of total time spent on the encounter, which includes face to face time and non-face to face time preparing to see the patient (eg, review of tests), Obtaining and/or reviewing separately obtained history, Documenting clinical information in the electronic or other health record, Independently interpreting results (not separately reported) and communicating results to the patient/family/caregiver, or Care coordination (not separately reported).

## 2024-10-11 NOTE — LETTER
October 11, 2024    Swetha Lomeli  [address] [facility] [address]         [facility]  [address]  [facility] [address]  [place] [address]  Phone: [phone] October 11, 2024     Patient: wSetha Lomeli   YOB: 1995   Date of Visit: 10/11/2024       To Whom It May Concern:    It is my medical opinion that Swetha Lomeli  should start her maternity leave on 10/25/24 with anticipation will likely deliver early due to complications in pregnancy .    If you have any questions or concerns, please don't hesitate to call.    Sincerely,        Fariba James MD

## 2024-10-12 ENCOUNTER — HOSPITAL ENCOUNTER (OUTPATIENT)
Facility: HOSPITAL | Age: 29
Discharge: HOME | End: 2024-10-12
Attending: OBSTETRICS & GYNECOLOGY | Admitting: STUDENT IN AN ORGANIZED HEALTH CARE EDUCATION/TRAINING PROGRAM
Payer: MEDICAID

## 2024-10-12 VITALS
TEMPERATURE: 98 F | SYSTOLIC BLOOD PRESSURE: 135 MMHG | RESPIRATION RATE: 18 BRPM | DIASTOLIC BLOOD PRESSURE: 72 MMHG | OXYGEN SATURATION: 100 % | HEART RATE: 74 BPM

## 2024-10-12 DIAGNOSIS — Z36.89 NST (NON-STRESS TEST) REACTIVE: ICD-10-CM

## 2024-10-12 LAB — GROUP B STREPTOCOCCUS, PCR: NEGATIVE

## 2024-10-12 PROCEDURE — 63600175 PHARM REV CODE 636 W HCPCS: Performed by: OBSTETRICS & GYNECOLOGY

## 2024-10-12 PROCEDURE — 59025 FETAL NON-STRESS TEST: CPT

## 2024-10-12 RX ORDER — BETAMETHASONE SODIUM PHOSPHATE AND BETAMETHASONE ACETATE 3; 3 MG/ML; MG/ML
12 INJECTION, SUSPENSION INTRA-ARTICULAR; INTRALESIONAL; INTRAMUSCULAR; SOFT TISSUE ONCE
Status: COMPLETED | OUTPATIENT
Start: 2024-10-12 | End: 2024-10-12

## 2024-10-12 RX ADMIN — BETAMETHASONE SODIUM PHOSPHATE AND BETAMETHASONE ACETATE 12 MG: 3; 3 INJECTION, SUSPENSION INTRA-ARTICULAR; INTRALESIONAL; INTRAMUSCULAR at 01:10

## 2024-10-12 NOTE — PROGRESS NOTES
OB/GYN Labor and Delivery Triage Note    Subjective:  Pt presents with CC of: for second BMZ shot and NST    denies Contraction,  Vaginal bleeding,  Leakage of fluid,  Symptoms of pre-eclampsia,  reports good fetal movement    Pregnancy is otherwise complicated by: HTN, gDM, anemia     Her OBGYN is: Dr. James  Next appointment is: 10/18/24    Objective:  /72   Pulse 74   Temp 98.2 °F (36.8 °C) (Oral)   Resp 18   LMP 2024   SpO2 100%         Fetal Heart Tracing  Baseline: 130  Variability: mod  Accelerations: +  Decelerations: -    Contractions:  Cashiers: none          Assessment and Plan:   29 y.o.F  with IUP@ 35w1d        For second BMZ (Friday started due to elevated BP in clinic)  - NST reviewed, reactive  - BMZ given   - Dispo: home         Belinda Ramirez MD

## 2024-10-12 NOTE — NURSING
Report called to Dr. Ramirez. NST reactive. FHT 130s, moderate variability, no contractions. Denies headache, blurry vision, vaginal bleeding, or leaking of fluid. Good fetal movement. Pt okay to be discharged home with scheduled follow up.

## 2024-10-14 ENCOUNTER — PROCEDURE VISIT (OUTPATIENT)
Dept: MATERNAL FETAL MEDICINE | Facility: CLINIC | Age: 29
End: 2024-10-14
Payer: MEDICAID

## 2024-10-14 DIAGNOSIS — O24.410 DIET CONTROLLED GESTATIONAL DIABETES MELLITUS (GDM) IN THIRD TRIMESTER: ICD-10-CM

## 2024-10-14 DIAGNOSIS — O10.012 PRE-EXISTING ESSENTIAL HYPERTENSION DURING PREGNANCY IN SECOND TRIMESTER: ICD-10-CM

## 2024-10-14 PROCEDURE — 76819 FETAL BIOPHYS PROFIL W/O NST: CPT | Mod: PBBFAC,PO | Performed by: OBSTETRICS & GYNECOLOGY

## 2024-10-18 ENCOUNTER — HOSPITAL ENCOUNTER (OUTPATIENT)
Dept: OBSTETRICS AND GYNECOLOGY | Facility: HOSPITAL | Age: 29
Discharge: HOME | End: 2024-10-18
Attending: OBSTETRICS & GYNECOLOGY
Payer: MEDICAID

## 2024-10-18 ENCOUNTER — ROUTINE PRENATAL (OUTPATIENT)
Dept: OBSTETRICS AND GYNECOLOGY | Facility: CLINIC | Age: 29
End: 2024-10-18
Payer: MEDICAID

## 2024-10-18 VITALS
HEART RATE: 91 BPM | OXYGEN SATURATION: 97 % | SYSTOLIC BLOOD PRESSURE: 134 MMHG | HEART RATE: 83 BPM | WEIGHT: 224.31 LBS | RESPIRATION RATE: 19 BRPM | DIASTOLIC BLOOD PRESSURE: 75 MMHG | DIASTOLIC BLOOD PRESSURE: 86 MMHG | SYSTOLIC BLOOD PRESSURE: 132 MMHG | BODY MASS INDEX: 41.03 KG/M2

## 2024-10-18 DIAGNOSIS — O24.415 GESTATIONAL DIABETES MELLITUS (GDM) IN THIRD TRIMESTER CONTROLLED ON ORAL HYPOGLYCEMIC DRUG: ICD-10-CM

## 2024-10-18 DIAGNOSIS — Z34.90 ENCOUNTER FOR INDUCTION OF LABOR: ICD-10-CM

## 2024-10-18 DIAGNOSIS — Z3A.36 36 WEEKS GESTATION OF PREGNANCY: Primary | ICD-10-CM

## 2024-10-18 LAB
BILIRUB SERPL-MCNC: ABNORMAL MG/DL
BLOOD URINE, POC: ABNORMAL
CLARITY, POC UA: CLEAR
COLOR, POC UA: YELLOW
GLUCOSE UR QL STRIP: ABNORMAL
KETONES UR QL STRIP: ABNORMAL
LEUKOCYTE ESTERASE URINE, POC: ABNORMAL
NITRITE, POC UA: ABNORMAL
PH, POC UA: 7
PROTEIN, POC: ABNORMAL
SPECIFIC GRAVITY, POC UA: 1.01
UROBILINOGEN, POC UA: 0.2

## 2024-10-18 PROCEDURE — 99213 OFFICE O/P EST LOW 20 MIN: CPT | Mod: PBBFAC,25,TH,PO | Performed by: OBSTETRICS & GYNECOLOGY

## 2024-10-18 PROCEDURE — 99999 PR PBB SHADOW E&M-EST. PATIENT-LVL III: CPT | Mod: PBBFAC,,, | Performed by: OBSTETRICS & GYNECOLOGY

## 2024-10-18 PROCEDURE — 59025 FETAL NON-STRESS TEST: CPT

## 2024-10-18 RX ORDER — ONDANSETRON 8 MG/1
8 TABLET, ORALLY DISINTEGRATING ORAL EVERY 8 HOURS PRN
OUTPATIENT
Start: 2024-10-18

## 2024-10-18 RX ORDER — OXYTOCIN 10 [USP'U]/ML
10 INJECTION, SOLUTION INTRAMUSCULAR; INTRAVENOUS ONCE AS NEEDED
OUTPATIENT
Start: 2024-10-18 | End: 2036-03-16

## 2024-10-18 RX ORDER — SIMETHICONE 80 MG
1 TABLET,CHEWABLE ORAL 4 TIMES DAILY PRN
OUTPATIENT
Start: 2024-10-18

## 2024-10-18 RX ORDER — MISOPROSTOL 100 MCG
25 TABLET ORAL EVERY 4 HOURS PRN
OUTPATIENT
Start: 2024-10-18

## 2024-10-18 RX ORDER — ACETAMINOPHEN 325 MG/1
650 TABLET ORAL EVERY 6 HOURS PRN
OUTPATIENT
Start: 2024-10-18

## 2024-10-18 RX ORDER — BUTORPHANOL TARTRATE 1 MG/ML
1 INJECTION INTRAMUSCULAR; INTRAVENOUS
OUTPATIENT
Start: 2024-10-18

## 2024-10-18 RX ORDER — BUTORPHANOL TARTRATE 2 MG/ML
2 INJECTION INTRAMUSCULAR; INTRAVENOUS
OUTPATIENT
Start: 2024-10-18

## 2024-10-18 RX ORDER — MUPIROCIN 20 MG/G
OINTMENT TOPICAL
OUTPATIENT
Start: 2024-10-18

## 2024-10-18 RX ORDER — CARBOPROST TROMETHAMINE 250 UG/ML
250 INJECTION, SOLUTION INTRAMUSCULAR
OUTPATIENT
Start: 2024-10-18

## 2024-10-18 RX ORDER — METHYLERGONOVINE MALEATE 0.2 MG/ML
200 INJECTION INTRAVENOUS ONCE AS NEEDED
OUTPATIENT
Start: 2024-10-18 | End: 2036-03-16

## 2024-10-18 RX ORDER — SODIUM CHLORIDE 9 MG/ML
INJECTION, SOLUTION INTRAVENOUS
OUTPATIENT
Start: 2024-10-18

## 2024-10-18 RX ORDER — TERBUTALINE SULFATE 1 MG/ML
0.25 INJECTION SUBCUTANEOUS
OUTPATIENT
Start: 2024-10-18

## 2024-10-18 RX ORDER — MISOPROSTOL 200 UG/1
800 TABLET ORAL ONCE AS NEEDED
OUTPATIENT
Start: 2024-10-18

## 2024-10-18 RX ORDER — MISOPROSTOL 200 UG/1
800 TABLET ORAL ONCE AS NEEDED
OUTPATIENT
Start: 2024-10-18 | End: 2036-03-16

## 2024-10-18 RX ORDER — DIPHENOXYLATE HYDROCHLORIDE AND ATROPINE SULFATE 2.5; .025 MG/1; MG/1
2 TABLET ORAL EVERY 6 HOURS PRN
OUTPATIENT
Start: 2024-10-18

## 2024-10-18 RX ORDER — OXYTOCIN-SODIUM CHLORIDE 0.9% IV SOLN 30 UNIT/500ML 30-0.9/5 UT/ML-%
10 SOLUTION INTRAVENOUS ONCE AS NEEDED
OUTPATIENT
Start: 2024-10-18 | End: 2036-03-16

## 2024-10-18 RX ORDER — LIDOCAINE HYDROCHLORIDE 10 MG/ML
10 INJECTION, SOLUTION INFILTRATION; PERINEURAL ONCE AS NEEDED
OUTPATIENT
Start: 2024-10-18 | End: 2036-03-16

## 2024-10-18 RX ORDER — CALCIUM CARBONATE 200(500)MG
500 TABLET,CHEWABLE ORAL 3 TIMES DAILY PRN
OUTPATIENT
Start: 2024-10-18

## 2024-10-18 RX ORDER — OXYTOCIN-SODIUM CHLORIDE 0.9% IV SOLN 30 UNIT/500ML 30-0.9/5 UT/ML-%
95 SOLUTION INTRAVENOUS CONTINUOUS PRN
OUTPATIENT
Start: 2024-10-18

## 2024-10-18 NOTE — PROGRESS NOTES
Doing well   FHT normal   FH normal   Cervix ft//th      @ 36 0/7 wga   IOB labs  Anatomy done    MT21 negative   3rd trim labs done   GBS -     GDM:   Did DM education   Set up for growth US Q4 and BPP weekly  Metformin 1000 QHS  Didn't bring log today but some fastings elevated, will message to me  HTN: has had elevated BP before pregnancy but never formally dx;   Cont ASA and  on labetalol 300 BID   Will send to L&D for monitoring and labs, discussed BMZ and she will think about it   IOL most likely 37-38 wga since BP getting harder to control on baseline medications with GDM   BMZ done 10/11/24 and 10/12/24  IOL 10/25 at 6 am  Anemia: iron  Marginal cord insertion       Growth US: 9/24/24 28%, 2022gm, 4lb 7 oz, vertex     -Dr. Shweta Carlton [facility]       Face to Face time with patient: 35 minutes of total time spent on the encounter, which includes face to face time and non-face to face time preparing to see the patient (eg, review of tests), Obtaining and/or reviewing separately obtained history, Documenting clinical information in the electronic or other health record, Independently interpreting results (not separately reported) and communicating results to the patient/family/caregiver, or Care coordination (not separately reported).

## 2024-10-21 ENCOUNTER — PATIENT MESSAGE (OUTPATIENT)
Dept: OBSTETRICS AND GYNECOLOGY | Facility: CLINIC | Age: 29
End: 2024-10-21
Payer: MEDICAID

## 2024-10-21 ENCOUNTER — HOSPITAL ENCOUNTER (INPATIENT)
Facility: HOSPITAL | Age: 29
LOS: 3 days | Discharge: HOME | End: 2024-10-24
Attending: STUDENT IN AN ORGANIZED HEALTH CARE EDUCATION/TRAINING PROGRAM | Admitting: OBSTETRICS & GYNECOLOGY
Payer: MEDICAID

## 2024-10-21 DIAGNOSIS — Z34.90 ENCOUNTER FOR INDUCTION OF LABOR: ICD-10-CM

## 2024-10-21 DIAGNOSIS — Z3A.36 36 WEEKS GESTATION OF PREGNANCY: ICD-10-CM

## 2024-10-21 DIAGNOSIS — R87.810 ASCUS WITH POSITIVE HIGH RISK HPV CERVICAL: ICD-10-CM

## 2024-10-21 DIAGNOSIS — R87.610 ASCUS WITH POSITIVE HIGH RISK HPV CERVICAL: ICD-10-CM

## 2024-10-21 DIAGNOSIS — O11.9 PREECLAMPSIA COMPLICATING HYPERTENSION: ICD-10-CM

## 2024-10-21 DIAGNOSIS — O16.3 HIGH BLOOD PRESSURE AFFECTING PREGNANCY IN THIRD TRIMESTER, ANTEPARTUM: ICD-10-CM

## 2024-10-21 LAB
ABO + RH BLD: NORMAL
ALBUMIN SERPL BCP-MCNC: 2.6 G/DL (ref 3.5–5.2)
ALP SERPL-CCNC: 136 U/L (ref 40–150)
ALT SERPL W/O P-5'-P-CCNC: 15 U/L (ref 10–44)
ANION GAP SERPL CALC-SCNC: 8 MMOL/L (ref 8–16)
AST SERPL-CCNC: 14 U/L (ref 10–40)
BASOPHILS # BLD AUTO: 0.01 K/UL (ref 0–0.2)
BASOPHILS NFR BLD: 0.1 % (ref 0–1.9)
BILIRUB SERPL-MCNC: 0.3 MG/DL (ref 0.1–1)
BLD GP AB SCN CELLS X3 SERPL QL: NORMAL
BUN SERPL-MCNC: 9 MG/DL (ref 6–20)
CALCIUM SERPL-MCNC: 9.5 MG/DL (ref 8.7–10.5)
CHLORIDE SERPL-SCNC: 107 MMOL/L (ref 95–110)
CO2 SERPL-SCNC: 23 MMOL/L (ref 23–29)
CREAT SERPL-MCNC: 0.7 MG/DL (ref 0.5–1.4)
CREAT UR-MCNC: 183.1 MG/DL (ref 15–325)
DIFFERENTIAL METHOD BLD: ABNORMAL
EOSINOPHIL # BLD AUTO: 0.2 K/UL (ref 0–0.5)
EOSINOPHIL NFR BLD: 1.8 % (ref 0–8)
ERYTHROCYTE [DISTWIDTH] IN BLOOD BY AUTOMATED COUNT: 15.1 % (ref 11.5–14.5)
EST. GFR  (NO RACE VARIABLE): >60 ML/MIN/1.73 M^2
GLUCOSE SERPL-MCNC: 91 MG/DL (ref 70–110)
HCT VFR BLD AUTO: 26.5 % (ref 37–48.5)
HGB BLD-MCNC: 8.2 G/DL (ref 12–16)
IMM GRANULOCYTES # BLD AUTO: 0.04 K/UL (ref 0–0.04)
IMM GRANULOCYTES NFR BLD AUTO: 0.5 % (ref 0–0.5)
LYMPHOCYTES # BLD AUTO: 1.2 K/UL (ref 1–4.8)
LYMPHOCYTES NFR BLD: 13.8 % (ref 18–48)
MCH RBC QN AUTO: 25.2 PG (ref 27–31)
MCHC RBC AUTO-ENTMCNC: 30.9 G/DL (ref 32–36)
MCV RBC AUTO: 81 FL (ref 82–98)
MONOCYTES # BLD AUTO: 0.5 K/UL (ref 0.3–1)
MONOCYTES NFR BLD: 5.5 % (ref 4–15)
NEUTROPHILS # BLD AUTO: 6.8 K/UL (ref 1.8–7.7)
NEUTROPHILS NFR BLD: 78.3 % (ref 38–73)
NRBC BLD-RTO: 0 /100 WBC
PLATELET # BLD AUTO: 163 K/UL (ref 150–450)
PMV BLD AUTO: 12.4 FL (ref 9.2–12.9)
POCT GLUCOSE: 116 MG/DL (ref 70–110)
POTASSIUM SERPL-SCNC: 3.9 MMOL/L (ref 3.5–5.1)
PROT SERPL-MCNC: 6.4 G/DL (ref 6–8.4)
PROT UR-MCNC: 48 MG/DL (ref 0–15)
PROT/CREAT UR: 0.26 MG/G{CREAT} (ref 0–0.2)
RBC # BLD AUTO: 3.26 M/UL (ref 4–5.4)
SODIUM SERPL-SCNC: 138 MMOL/L (ref 136–145)
TREPONEMA PALLIDUM IGG+IGM AB [PRESENCE] IN SERUM OR PLASMA BY IMMUNOASSAY: NONREACTIVE
WBC # BLD AUTO: 8.74 K/UL (ref 3.9–12.7)

## 2024-10-21 PROCEDURE — 63600175 PHARM REV CODE 636 W HCPCS: Performed by: OBSTETRICS & GYNECOLOGY

## 2024-10-21 PROCEDURE — 36415 COLL VENOUS BLD VENIPUNCTURE: CPT | Performed by: OBSTETRICS & GYNECOLOGY

## 2024-10-21 PROCEDURE — 84156 ASSAY OF PROTEIN URINE: CPT | Performed by: STUDENT IN AN ORGANIZED HEALTH CARE EDUCATION/TRAINING PROGRAM

## 2024-10-21 PROCEDURE — 11000001 HC ACUTE MED/SURG PRIVATE ROOM

## 2024-10-21 PROCEDURE — 25000003 PHARM REV CODE 250: Performed by: OBSTETRICS & GYNECOLOGY

## 2024-10-21 PROCEDURE — 63600175 PHARM REV CODE 636 W HCPCS: Performed by: STUDENT IN AN ORGANIZED HEALTH CARE EDUCATION/TRAINING PROGRAM

## 2024-10-21 PROCEDURE — 25000003 PHARM REV CODE 250: Performed by: STUDENT IN AN ORGANIZED HEALTH CARE EDUCATION/TRAINING PROGRAM

## 2024-10-21 PROCEDURE — 85025 COMPLETE CBC W/AUTO DIFF WBC: CPT | Performed by: STUDENT IN AN ORGANIZED HEALTH CARE EDUCATION/TRAINING PROGRAM

## 2024-10-21 PROCEDURE — 80053 COMPREHEN METABOLIC PANEL: CPT | Performed by: STUDENT IN AN ORGANIZED HEALTH CARE EDUCATION/TRAINING PROGRAM

## 2024-10-21 PROCEDURE — 82570 ASSAY OF URINE CREATININE: CPT | Performed by: STUDENT IN AN ORGANIZED HEALTH CARE EDUCATION/TRAINING PROGRAM

## 2024-10-21 PROCEDURE — 86850 RBC ANTIBODY SCREEN: CPT | Performed by: STUDENT IN AN ORGANIZED HEALTH CARE EDUCATION/TRAINING PROGRAM

## 2024-10-21 PROCEDURE — 86593 SYPHILIS TEST NON-TREP QUANT: CPT | Performed by: OBSTETRICS & GYNECOLOGY

## 2024-10-21 RX ORDER — MISOPROSTOL 100 MCG
25 TABLET ORAL EVERY 4 HOURS PRN
Status: DISCONTINUED | OUTPATIENT
Start: 2024-10-21 | End: 2024-10-22

## 2024-10-21 RX ORDER — DIPHENOXYLATE HYDROCHLORIDE AND ATROPINE SULFATE 2.5; .025 MG/1; MG/1
2 TABLET ORAL EVERY 6 HOURS PRN
Status: DISCONTINUED | OUTPATIENT
Start: 2024-10-21 | End: 2024-10-22

## 2024-10-21 RX ORDER — MAGNESIUM SULFATE HEPTAHYDRATE 40 MG/ML
2 INJECTION, SOLUTION INTRAVENOUS CONTINUOUS
Status: DISCONTINUED | OUTPATIENT
Start: 2024-10-21 | End: 2024-10-22

## 2024-10-21 RX ORDER — OXYTOCIN 10 [USP'U]/ML
10 INJECTION, SOLUTION INTRAMUSCULAR; INTRAVENOUS ONCE AS NEEDED
Status: DISCONTINUED | OUTPATIENT
Start: 2024-10-21 | End: 2024-10-22

## 2024-10-21 RX ORDER — METHYLERGONOVINE MALEATE 0.2 MG/ML
200 INJECTION INTRAVENOUS ONCE AS NEEDED
Status: DISCONTINUED | OUTPATIENT
Start: 2024-10-21 | End: 2024-10-22

## 2024-10-21 RX ORDER — MISOPROSTOL 100 MCG
50 TABLET ORAL EVERY 6 HOURS
Status: DISCONTINUED | OUTPATIENT
Start: 2024-10-21 | End: 2024-10-21

## 2024-10-21 RX ORDER — OXYTOCIN-SODIUM CHLORIDE 0.9% IV SOLN 30 UNIT/500ML 30-0.9/5 UT/ML-%
95 SOLUTION INTRAVENOUS CONTINUOUS PRN
Status: DISCONTINUED | OUTPATIENT
Start: 2024-10-21 | End: 2024-10-22

## 2024-10-21 RX ORDER — SODIUM CHLORIDE 9 MG/ML
INJECTION, SOLUTION INTRAVENOUS
Status: DISCONTINUED | OUTPATIENT
Start: 2024-10-21 | End: 2024-10-22

## 2024-10-21 RX ORDER — MISOPROSTOL 200 UG/1
800 TABLET ORAL ONCE AS NEEDED
Status: DISCONTINUED | OUTPATIENT
Start: 2024-10-21 | End: 2024-10-22

## 2024-10-21 RX ORDER — MISOPROSTOL 200 UG/1
800 TABLET ORAL ONCE AS NEEDED
Status: COMPLETED | OUTPATIENT
Start: 2024-10-21 | End: 2024-10-22

## 2024-10-21 RX ORDER — BUTALBITAL, ACETAMINOPHEN AND CAFFEINE 50; 325; 40 MG/1; MG/1; MG/1
1 TABLET ORAL EVERY 4 HOURS PRN
Status: DISCONTINUED | OUTPATIENT
Start: 2024-10-21 | End: 2024-10-22

## 2024-10-21 RX ORDER — MUPIROCIN 20 MG/G
OINTMENT TOPICAL
Status: DISCONTINUED | OUTPATIENT
Start: 2024-10-21 | End: 2024-10-22

## 2024-10-21 RX ORDER — BUTORPHANOL TARTRATE 2 MG/ML
1 INJECTION INTRAMUSCULAR; INTRAVENOUS
Status: DISCONTINUED | OUTPATIENT
Start: 2024-10-21 | End: 2024-10-22

## 2024-10-21 RX ORDER — TERBUTALINE SULFATE 1 MG/ML
0.25 INJECTION SUBCUTANEOUS
Status: DISCONTINUED | OUTPATIENT
Start: 2024-10-21 | End: 2024-10-22

## 2024-10-21 RX ORDER — LIDOCAINE HYDROCHLORIDE 10 MG/ML
10 INJECTION, SOLUTION INFILTRATION; PERINEURAL ONCE AS NEEDED
Status: DISCONTINUED | OUTPATIENT
Start: 2024-10-21 | End: 2024-10-22

## 2024-10-21 RX ORDER — HYDRALAZINE HYDROCHLORIDE 20 MG/ML
10 INJECTION INTRAMUSCULAR; INTRAVENOUS ONCE AS NEEDED
Status: DISCONTINUED | OUTPATIENT
Start: 2024-10-21 | End: 2024-10-22

## 2024-10-21 RX ORDER — CARBOPROST TROMETHAMINE 250 UG/ML
250 INJECTION, SOLUTION INTRAMUSCULAR
Status: DISCONTINUED | OUTPATIENT
Start: 2024-10-21 | End: 2024-10-22

## 2024-10-21 RX ORDER — LABETALOL HYDROCHLORIDE 5 MG/ML
80 INJECTION, SOLUTION INTRAVENOUS ONCE AS NEEDED
Status: DISCONTINUED | OUTPATIENT
Start: 2024-10-21 | End: 2024-10-21

## 2024-10-21 RX ORDER — MISOPROSTOL 100 MCG
50 TABLET ORAL EVERY 4 HOURS
Status: DISCONTINUED | OUTPATIENT
Start: 2024-10-21 | End: 2024-10-22

## 2024-10-21 RX ORDER — LABETALOL HYDROCHLORIDE 5 MG/ML
40 INJECTION, SOLUTION INTRAVENOUS ONCE AS NEEDED
Status: COMPLETED | OUTPATIENT
Start: 2024-10-21 | End: 2024-10-21

## 2024-10-21 RX ORDER — BUTORPHANOL TARTRATE 2 MG/ML
2 INJECTION INTRAMUSCULAR; INTRAVENOUS
Status: DISCONTINUED | OUTPATIENT
Start: 2024-10-21 | End: 2024-10-22

## 2024-10-21 RX ORDER — SIMETHICONE 80 MG
1 TABLET,CHEWABLE ORAL 4 TIMES DAILY PRN
Status: DISCONTINUED | OUTPATIENT
Start: 2024-10-21 | End: 2024-10-22

## 2024-10-21 RX ORDER — MAGNESIUM SULFATE HEPTAHYDRATE 40 MG/ML
4 INJECTION, SOLUTION INTRAVENOUS ONCE
Status: COMPLETED | OUTPATIENT
Start: 2024-10-21 | End: 2024-10-21

## 2024-10-21 RX ORDER — ONDANSETRON 8 MG/1
8 TABLET, ORALLY DISINTEGRATING ORAL EVERY 8 HOURS PRN
Status: DISCONTINUED | OUTPATIENT
Start: 2024-10-21 | End: 2024-10-22

## 2024-10-21 RX ORDER — LABETALOL HYDROCHLORIDE 5 MG/ML
80 INJECTION, SOLUTION INTRAVENOUS ONCE AS NEEDED
Status: DISCONTINUED | OUTPATIENT
Start: 2024-10-21 | End: 2024-10-22

## 2024-10-21 RX ORDER — HYDRALAZINE HYDROCHLORIDE 20 MG/ML
10 INJECTION INTRAMUSCULAR; INTRAVENOUS ONCE AS NEEDED
Status: DISCONTINUED | OUTPATIENT
Start: 2024-10-21 | End: 2024-10-21

## 2024-10-21 RX ORDER — SODIUM CHLORIDE, SODIUM LACTATE, POTASSIUM CHLORIDE, CALCIUM CHLORIDE 600; 310; 30; 20 MG/100ML; MG/100ML; MG/100ML; MG/100ML
INJECTION, SOLUTION INTRAVENOUS CONTINUOUS
Status: DISCONTINUED | OUTPATIENT
Start: 2024-10-21 | End: 2024-10-22

## 2024-10-21 RX ORDER — ACETAMINOPHEN 325 MG/1
650 TABLET ORAL EVERY 6 HOURS PRN
Status: DISCONTINUED | OUTPATIENT
Start: 2024-10-21 | End: 2024-10-22

## 2024-10-21 RX ORDER — CALCIUM CARBONATE 200(500)MG
500 TABLET,CHEWABLE ORAL 3 TIMES DAILY PRN
Status: DISCONTINUED | OUTPATIENT
Start: 2024-10-21 | End: 2024-10-22

## 2024-10-21 RX ORDER — CALCIUM GLUCONATE 98 MG/ML
1 INJECTION, SOLUTION INTRAVENOUS
Status: DISCONTINUED | OUTPATIENT
Start: 2024-10-21 | End: 2024-10-22

## 2024-10-21 RX ORDER — LABETALOL HYDROCHLORIDE 5 MG/ML
40 INJECTION, SOLUTION INTRAVENOUS ONCE AS NEEDED
Status: DISCONTINUED | OUTPATIENT
Start: 2024-10-21 | End: 2024-10-21

## 2024-10-21 RX ORDER — OXYTOCIN-SODIUM CHLORIDE 0.9% IV SOLN 30 UNIT/500ML 30-0.9/5 UT/ML-%
10 SOLUTION INTRAVENOUS ONCE AS NEEDED
Status: DISCONTINUED | OUTPATIENT
Start: 2024-10-21 | End: 2024-10-22

## 2024-10-21 RX ORDER — LABETALOL HYDROCHLORIDE 5 MG/ML
20 INJECTION, SOLUTION INTRAVENOUS ONCE AS NEEDED
Status: COMPLETED | OUTPATIENT
Start: 2024-10-21 | End: 2024-10-21

## 2024-10-21 RX ORDER — LABETALOL HYDROCHLORIDE 5 MG/ML
20 INJECTION, SOLUTION INTRAVENOUS ONCE AS NEEDED
Status: DISCONTINUED | OUTPATIENT
Start: 2024-10-21 | End: 2024-10-21

## 2024-10-21 RX ADMIN — LABETALOL HYDROCHLORIDE 40 MG: 5 INJECTION INTRAVENOUS at 03:10

## 2024-10-21 RX ADMIN — MISOPROSTOL 50 MCG: 100 TABLET ORAL at 05:10

## 2024-10-21 RX ADMIN — LABETALOL HYDROCHLORIDE 300 MG: 200 TABLET, FILM COATED ORAL at 09:10

## 2024-10-21 RX ADMIN — BUTALBITAL, ACETAMINOPHEN, AND CAFFEINE 1 TABLET: 325; 50; 40 TABLET ORAL at 10:10

## 2024-10-21 RX ADMIN — LABETALOL HYDROCHLORIDE 20 MG: 5 INJECTION INTRAVENOUS at 02:10

## 2024-10-21 RX ADMIN — SODIUM CHLORIDE, POTASSIUM CHLORIDE, SODIUM LACTATE AND CALCIUM CHLORIDE: 600; 310; 30; 20 INJECTION, SOLUTION INTRAVENOUS at 04:10

## 2024-10-21 RX ADMIN — MISOPROSTOL 50 MCG: 100 TABLET ORAL at 09:10

## 2024-10-21 RX ADMIN — ACETAMINOPHEN 650 MG: 325 TABLET ORAL at 06:10

## 2024-10-21 RX ADMIN — MAGNESIUM SULFATE HEPTAHYDRATE 4 G: 40 INJECTION, SOLUTION INTRAVENOUS at 03:10

## 2024-10-21 NOTE — PROGRESS NOTES
Two severe range pressures noted. Cuff adjusted and will monitor next readings before beginning Labetalol protocol.

## 2024-10-21 NOTE — NURSING
Severe range pressures noted. Anesthesia in room for IV placement. 20mg of Labetalol to be given once access obtained.

## 2024-10-21 NOTE — H&P
CC:  elevated blood pressures    HPI:   Swetha Lomeli is a 29 y.o. female  at 36 3/7 EGA who presents here for elevated blood pressures. No CP/SOB/scotoma or RUQ pain. Her BP were >160/110 since last night. Took her labetalol last night as scheduled.      ROS:  neg    Past Medical History:   Diagnosis Date    Anxiety     Asthma     Migraine     Seizures      No past surgical history on file.  Family History   Problem Relation Name Age of Onset    Breast cancer Paternal Grandmother  75    Heart disease Maternal Grandfather      Colon cancer Neg Hx      Ovarian cancer Neg Hx       Allergies: Patient has no known allergies.  Social History     Socioeconomic History    Marital status: Significant Other   Tobacco Use    Smoking status: Former    Smokeless tobacco: Never   Substance and Sexual Activity    Alcohol use: Not Currently     Comment: occasional     Drug use: No    Sexual activity: Yes     Partners: Male     Birth control/protection: None     Social Drivers of Health     Financial Resource Strain: Low Risk  (2024)    Overall Financial Resource Strain (CARDIA)     Difficulty of Paying Living Expenses: Not very hard   Food Insecurity: No Food Insecurity (2024)    Hunger Vital Sign     Worried About Running Out of Food in the Last Year: Never true     Ran Out of Food in the Last Year: Never true   Transportation Needs: No Transportation Needs (2024)    TRANSPORTATION NEEDS     Transportation : No   Stress: No Stress Concern Present (2024)    [employer] [place] [employer] - Occupational Stress Questionnaire     Feeling of Stress : Only a little   Housing Stability: Low Risk  (2024)    Housing Stability Vital Sign     Unable to Pay for Housing in the Last Year: No     Homeless in the Last Year: No     Meds:        PE:   Temp:  [98.4 °F (36.9 °C)]   Pulse:  [74-87]   BP: (144-154)/(82-97)   SpO2:  [96 %-100 %]     APPEARANCE: Well nourished, well developed, in no  acute distress.    ABDOMEN:  Gravid.   SVE: deferred for now   /mod oswald/+ accel -decel   [address]: none     Assessment:  29  at 36 3/7 wga  HTN: dx this pregnancy, on labetalol 300 BID and ASA, severe range BP at home, mild here; will get growth US and BPP now since missed appt to do it today, labs pending, may need IOL pending labs and US   S/p BMZ 10/12-  GDM: metformin 1000 QHS   Marginal cord insertion   Anemia  Asthma: no medications   Anxiety: stable

## 2024-10-21 NOTE — NURSING
(36w3d) reports to the unit with c/o of high home blood pressure readings. Reports that yesterday's evening and this morning's reading was in the 160's/100's after taking prescribed 300mg of labetalol. Denies any blurry vision or headaches. Edema noted in the legs, patient reports that this has been normal for her. Denies any LOF or vaginal bleeding. VSS. Dr. James notified of patient's arrival. Orders received to draw labs, p/c ratio and BPP. Blood pressure readings in the 140's/80's. Patient placed on the monitor and encouraged to po hydrate. Call bell within reach and safety maintained.        No

## 2024-10-21 NOTE — NURSING
Severe range pressures noted after 20mg dose of labetalol. Orders received from Dr. Welch to proceed with protocol and to begin mag infusion.     Patient updated on poc. Patient's mother remains at the bedside.

## 2024-10-22 ENCOUNTER — ANESTHESIA EVENT (OUTPATIENT)
Dept: OBSTETRICS AND GYNECOLOGY | Facility: HOSPITAL | Age: 29
End: 2024-10-22
Payer: MEDICAID

## 2024-10-22 ENCOUNTER — ANESTHESIA (OUTPATIENT)
Dept: OBSTETRICS AND GYNECOLOGY | Facility: HOSPITAL | Age: 29
End: 2024-10-22
Payer: MEDICAID

## 2024-10-22 PROBLEM — Z98.890 STATUS POST D&C: Status: ACTIVE | Noted: 2024-10-22

## 2024-10-22 PROBLEM — D64.9 ANEMIA: Status: ACTIVE | Noted: 2024-10-22

## 2024-10-22 LAB
ALLENS TEST: NORMAL
ALLENS TEST: NORMAL
BASOPHILS # BLD AUTO: 0.01 K/UL (ref 0–0.2)
BASOPHILS # BLD AUTO: 0.04 K/UL (ref 0–0.2)
BASOPHILS NFR BLD: 0.1 % (ref 0–1.9)
BASOPHILS NFR BLD: 0.2 % (ref 0–1.9)
BLD PROD TYP BPU: NORMAL
BLD PROD TYP BPU: NORMAL
BLOOD UNIT EXPIRATION DATE: NORMAL
BLOOD UNIT EXPIRATION DATE: NORMAL
BLOOD UNIT TYPE CODE: 6200
BLOOD UNIT TYPE CODE: 6200
BLOOD UNIT TYPE: NORMAL
BLOOD UNIT TYPE: NORMAL
CODING SYSTEM: NORMAL
CODING SYSTEM: NORMAL
CROSSMATCH INTERPRETATION: NORMAL
CROSSMATCH INTERPRETATION: NORMAL
DELSYS: NORMAL
DIFFERENTIAL METHOD BLD: ABNORMAL
DIFFERENTIAL METHOD BLD: ABNORMAL
DISPENSE STATUS: NORMAL
DISPENSE STATUS: NORMAL
EOSINOPHIL # BLD AUTO: 0.1 K/UL (ref 0–0.5)
EOSINOPHIL # BLD AUTO: 0.1 K/UL (ref 0–0.5)
EOSINOPHIL NFR BLD: 0.3 % (ref 0–8)
EOSINOPHIL NFR BLD: 0.7 % (ref 0–8)
ERYTHROCYTE [DISTWIDTH] IN BLOOD BY AUTOMATED COUNT: 15.8 % (ref 11.5–14.5)
ERYTHROCYTE [DISTWIDTH] IN BLOOD BY AUTOMATED COUNT: 16.7 % (ref 11.5–14.5)
FIBRINOGEN PPP-MCNC: 497 MG/DL (ref 182–400)
FIO2: 21 %
HCO3 UR-SCNC: 23.2 MMOL/L (ref 17–27)
HCT VFR BLD AUTO: 24.9 % (ref 37–48.5)
HCT VFR BLD AUTO: 27.7 % (ref 37–48.5)
HGB BLD-MCNC: 7.8 G/DL (ref 12–16)
HGB BLD-MCNC: 8.9 G/DL (ref 12–16)
IMM GRANULOCYTES # BLD AUTO: 0.07 K/UL (ref 0–0.04)
IMM GRANULOCYTES # BLD AUTO: 0.13 K/UL (ref 0–0.04)
IMM GRANULOCYTES NFR BLD AUTO: 0.6 % (ref 0–0.5)
IMM GRANULOCYTES NFR BLD AUTO: 0.6 % (ref 0–0.5)
LYMPHOCYTES # BLD AUTO: 0.9 K/UL (ref 1–4.8)
LYMPHOCYTES # BLD AUTO: 0.9 K/UL (ref 1–4.8)
LYMPHOCYTES NFR BLD: 4.3 % (ref 18–48)
LYMPHOCYTES NFR BLD: 7.7 % (ref 18–48)
MCH RBC QN AUTO: 25.4 PG (ref 27–31)
MCH RBC QN AUTO: 27 PG (ref 27–31)
MCHC RBC AUTO-ENTMCNC: 31.3 G/DL (ref 32–36)
MCHC RBC AUTO-ENTMCNC: 32.1 G/DL (ref 32–36)
MCV RBC AUTO: 81 FL (ref 82–98)
MCV RBC AUTO: 84 FL (ref 82–98)
MONOCYTES # BLD AUTO: 0.4 K/UL (ref 0.3–1)
MONOCYTES # BLD AUTO: 0.8 K/UL (ref 0.3–1)
MONOCYTES NFR BLD: 3 % (ref 4–15)
MONOCYTES NFR BLD: 3.7 % (ref 4–15)
NEUTROPHILS # BLD AUTO: 10.1 K/UL (ref 1.8–7.7)
NEUTROPHILS # BLD AUTO: 19.2 K/UL (ref 1.8–7.7)
NEUTROPHILS NFR BLD: 87.9 % (ref 38–73)
NEUTROPHILS NFR BLD: 90.9 % (ref 38–73)
NRBC BLD-RTO: 0 /100 WBC
NRBC BLD-RTO: 0 /100 WBC
PCO2 BLDA: 37 MMHG
PCO2 BLDA: 43.8 MMHG (ref 32–66)
PH SMN: 7.33 [PH] (ref 7.18–7.38)
PH SMN: 7.4 [PH] (ref 7.25–7.45)
PLATELET # BLD AUTO: 169 K/UL (ref 150–450)
PLATELET # BLD AUTO: 204 K/UL (ref 150–450)
PMV BLD AUTO: 11.2 FL (ref 9.2–12.9)
PMV BLD AUTO: 11.4 FL (ref 9.2–12.9)
PO2 BLDA: 20 MMHG (ref 6–31)
PO2 BLDA: 30.8 MMHG
POC BASE DEFICIT: -1.4 MMOL/L
POC BE: -3 MMOL/L
POC HCO3: 22.9 MMOL/L
POC PERFORMED BY: NORMAL
POC SATURATED O2: 27 %
POC SATURATED O2: 72.2 %
POC TCO2: 25 MMOL/L (ref 23–27)
POCT GLUCOSE: 103 MG/DL (ref 70–110)
POCT GLUCOSE: 119 MG/DL (ref 70–110)
POCT GLUCOSE: 91 MG/DL (ref 70–110)
RBC # BLD AUTO: 3.07 M/UL (ref 4–5.4)
RBC # BLD AUTO: 3.3 M/UL (ref 4–5.4)
SAMPLE: NORMAL
SITE: NORMAL
SPECIMEN SOURCE: NORMAL
TRANS ERYTHROCYTES VOL PATIENT: NORMAL ML
TRANS ERYTHROCYTES VOL PATIENT: NORMAL ML
WBC # BLD AUTO: 11.51 K/UL (ref 3.9–12.7)
WBC # BLD AUTO: 21.07 K/UL (ref 3.9–12.7)

## 2024-10-22 PROCEDURE — 36416 COLLJ CAPILLARY BLOOD SPEC: CPT

## 2024-10-22 PROCEDURE — 30233N0 TRANSFUSION OF AUTOLOGOUS RED BLOOD CELLS INTO PERIPHERAL VEIN, PERCUTANEOUS APPROACH: ICD-10-PCS | Performed by: OBSTETRICS & GYNECOLOGY

## 2024-10-22 PROCEDURE — 27200710 HC EPIDURAL INFUSION PUMP SET: Performed by: STUDENT IN AN ORGANIZED HEALTH CARE EDUCATION/TRAINING PROGRAM

## 2024-10-22 PROCEDURE — 51702 INSERT TEMP BLADDER CATH: CPT

## 2024-10-22 PROCEDURE — 10D17ZZ EXTRACTION OF PRODUCTS OF CONCEPTION, RETAINED, VIA NATURAL OR ARTIFICIAL OPENING: ICD-10-PCS | Performed by: OBSTETRICS & GYNECOLOGY

## 2024-10-22 PROCEDURE — 88307 TISSUE EXAM BY PATHOLOGIST: CPT | Performed by: PATHOLOGY

## 2024-10-22 PROCEDURE — 25000003 PHARM REV CODE 250: Performed by: NURSE ANESTHETIST, CERTIFIED REGISTERED

## 2024-10-22 PROCEDURE — 25000003 PHARM REV CODE 250: Performed by: STUDENT IN AN ORGANIZED HEALTH CARE EDUCATION/TRAINING PROGRAM

## 2024-10-22 PROCEDURE — 86920 COMPATIBILITY TEST SPIN: CPT | Performed by: OBSTETRICS & GYNECOLOGY

## 2024-10-22 PROCEDURE — 63600175 PHARM REV CODE 636 W HCPCS: Performed by: OBSTETRICS & GYNECOLOGY

## 2024-10-22 PROCEDURE — 99900035 HC TECH TIME PER 15 MIN (STAT)

## 2024-10-22 PROCEDURE — 63600175 PHARM REV CODE 636 W HCPCS: Performed by: STUDENT IN AN ORGANIZED HEALTH CARE EDUCATION/TRAINING PROGRAM

## 2024-10-22 PROCEDURE — 72100002 HC LABOR CARE, 1ST 8 HOURS

## 2024-10-22 PROCEDURE — 85025 COMPLETE CBC W/AUTO DIFF WBC: CPT | Mod: 91 | Performed by: OBSTETRICS & GYNECOLOGY

## 2024-10-22 PROCEDURE — C1751 CATH, INF, PER/CENT/MIDLINE: HCPCS | Performed by: STUDENT IN AN ORGANIZED HEALTH CARE EDUCATION/TRAINING PROGRAM

## 2024-10-22 PROCEDURE — 62326 NJX INTERLAMINAR LMBR/SAC: CPT | Performed by: NURSE ANESTHETIST, CERTIFIED REGISTERED

## 2024-10-22 PROCEDURE — 25000003 PHARM REV CODE 250: Performed by: OBSTETRICS & GYNECOLOGY

## 2024-10-22 PROCEDURE — 72200005 HC VAGINAL DELIVERY LEVEL II

## 2024-10-22 PROCEDURE — P9021 RED BLOOD CELLS UNIT: HCPCS | Performed by: OBSTETRICS & GYNECOLOGY

## 2024-10-22 PROCEDURE — 11000001 HC ACUTE MED/SURG PRIVATE ROOM

## 2024-10-22 PROCEDURE — 36415 COLL VENOUS BLD VENIPUNCTURE: CPT | Performed by: OBSTETRICS & GYNECOLOGY

## 2024-10-22 PROCEDURE — 85384 FIBRINOGEN ACTIVITY: CPT | Performed by: OBSTETRICS & GYNECOLOGY

## 2024-10-22 PROCEDURE — 3E0DXGC INTRODUCTION OF OTHER THERAPEUTIC SUBSTANCE INTO MOUTH AND PHARYNX, EXTERNAL APPROACH: ICD-10-PCS | Performed by: OBSTETRICS & GYNECOLOGY

## 2024-10-22 PROCEDURE — 25000242 PHARM REV CODE 250 ALT 637 W/ HCPCS: Performed by: OBSTETRICS & GYNECOLOGY

## 2024-10-22 PROCEDURE — 72100003 HC LABOR CARE, EA. ADDL. 8 HRS

## 2024-10-22 PROCEDURE — 63600175 PHARM REV CODE 636 W HCPCS: Performed by: NURSE ANESTHETIST, CERTIFIED REGISTERED

## 2024-10-22 RX ORDER — ACETAMINOPHEN 325 MG/1
650 TABLET ORAL EVERY 6 HOURS PRN
Status: DISCONTINUED | OUTPATIENT
Start: 2024-10-22 | End: 2024-10-24 | Stop reason: HOSPADM

## 2024-10-22 RX ORDER — METHYLERGONOVINE MALEATE 0.2 MG/ML
200 INJECTION INTRAVENOUS ONCE AS NEEDED
Status: DISCONTINUED | OUTPATIENT
Start: 2024-10-22 | End: 2024-10-24 | Stop reason: HOSPADM

## 2024-10-22 RX ORDER — OXYTOCIN-SODIUM CHLORIDE 0.9% IV SOLN 30 UNIT/500ML 30-0.9/5 UT/ML-%
95 SOLUTION INTRAVENOUS CONTINUOUS PRN
Status: DISCONTINUED | OUTPATIENT
Start: 2024-10-22 | End: 2024-10-24 | Stop reason: HOSPADM

## 2024-10-22 RX ORDER — CETIRIZINE HYDROCHLORIDE 5 MG/1
5 TABLET ORAL DAILY
Status: DISCONTINUED | OUTPATIENT
Start: 2024-10-22 | End: 2024-10-22

## 2024-10-22 RX ORDER — FAMOTIDINE 10 MG/ML
20 INJECTION INTRAVENOUS ONCE
Status: DISCONTINUED | OUTPATIENT
Start: 2024-10-22 | End: 2024-10-24 | Stop reason: HOSPADM

## 2024-10-22 RX ORDER — HYDRALAZINE HYDROCHLORIDE 20 MG/ML
10 INJECTION INTRAMUSCULAR; INTRAVENOUS ONCE AS NEEDED
Status: DISCONTINUED | OUTPATIENT
Start: 2024-10-22 | End: 2024-10-24 | Stop reason: HOSPADM

## 2024-10-22 RX ORDER — LABETALOL HYDROCHLORIDE 5 MG/ML
40 INJECTION, SOLUTION INTRAVENOUS ONCE AS NEEDED
Status: DISCONTINUED | OUTPATIENT
Start: 2024-10-22 | End: 2024-10-24 | Stop reason: HOSPADM

## 2024-10-22 RX ORDER — OXYCODONE AND ACETAMINOPHEN 5; 325 MG/1; MG/1
1 TABLET ORAL EVERY 4 HOURS PRN
Status: DISCONTINUED | OUTPATIENT
Start: 2024-10-22 | End: 2024-10-24 | Stop reason: HOSPADM

## 2024-10-22 RX ORDER — LABETALOL HYDROCHLORIDE 5 MG/ML
20 INJECTION, SOLUTION INTRAVENOUS ONCE AS NEEDED
Status: DISCONTINUED | OUTPATIENT
Start: 2024-10-22 | End: 2024-10-24 | Stop reason: HOSPADM

## 2024-10-22 RX ORDER — MISOPROSTOL 200 UG/1
400 TABLET ORAL EVERY 6 HOURS
Status: DISCONTINUED | OUTPATIENT
Start: 2024-10-23 | End: 2024-10-22

## 2024-10-22 RX ORDER — DOCUSATE SODIUM 100 MG/1
200 CAPSULE, LIQUID FILLED ORAL 2 TIMES DAILY PRN
Status: DISCONTINUED | OUTPATIENT
Start: 2024-10-22 | End: 2024-10-24 | Stop reason: HOSPADM

## 2024-10-22 RX ORDER — METOCLOPRAMIDE HYDROCHLORIDE 5 MG/ML
10 INJECTION INTRAMUSCULAR; INTRAVENOUS ONCE
Status: COMPLETED | OUTPATIENT
Start: 2024-10-22 | End: 2024-10-22

## 2024-10-22 RX ORDER — LABETALOL HYDROCHLORIDE 5 MG/ML
40 INJECTION, SOLUTION INTRAVENOUS ONCE AS NEEDED
Status: DISCONTINUED | OUTPATIENT
Start: 2024-10-22 | End: 2024-10-22

## 2024-10-22 RX ORDER — DIPHENHYDRAMINE HYDROCHLORIDE 50 MG/ML
25 INJECTION INTRAMUSCULAR; INTRAVENOUS EVERY 6 HOURS PRN
Status: DISCONTINUED | OUTPATIENT
Start: 2024-10-22 | End: 2024-10-22

## 2024-10-22 RX ORDER — NALBUPHINE HYDROCHLORIDE 10 MG/ML
2.5 INJECTION, SOLUTION INTRAMUSCULAR; INTRAVENOUS; SUBCUTANEOUS ONCE
Status: DISCONTINUED | OUTPATIENT
Start: 2024-10-22 | End: 2024-10-24 | Stop reason: HOSPADM

## 2024-10-22 RX ORDER — MAGNESIUM SULFATE HEPTAHYDRATE 40 MG/ML
2 INJECTION, SOLUTION INTRAVENOUS CONTINUOUS
Status: DISCONTINUED | OUTPATIENT
Start: 2024-10-22 | End: 2024-10-24 | Stop reason: HOSPADM

## 2024-10-22 RX ORDER — FAMOTIDINE 10 MG/ML
20 INJECTION INTRAVENOUS 2 TIMES DAILY
Status: DISCONTINUED | OUTPATIENT
Start: 2024-10-22 | End: 2024-10-22

## 2024-10-22 RX ORDER — HYDRALAZINE HYDROCHLORIDE 20 MG/ML
5 INJECTION INTRAMUSCULAR; INTRAVENOUS ONCE AS NEEDED
Status: COMPLETED | OUTPATIENT
Start: 2024-10-22 | End: 2024-10-22

## 2024-10-22 RX ORDER — SIMETHICONE 80 MG
1 TABLET,CHEWABLE ORAL EVERY 6 HOURS PRN
Status: DISCONTINUED | OUTPATIENT
Start: 2024-10-22 | End: 2024-10-24 | Stop reason: HOSPADM

## 2024-10-22 RX ORDER — OXYTOCIN 10 [USP'U]/ML
10 INJECTION, SOLUTION INTRAMUSCULAR; INTRAVENOUS ONCE AS NEEDED
Status: DISCONTINUED | OUTPATIENT
Start: 2024-10-22 | End: 2024-10-24 | Stop reason: HOSPADM

## 2024-10-22 RX ORDER — SODIUM CITRATE AND CITRIC ACID MONOHYDRATE 334; 500 MG/5ML; MG/5ML
30 SOLUTION ORAL ONCE
Status: DISCONTINUED | OUTPATIENT
Start: 2024-10-22 | End: 2024-10-24 | Stop reason: HOSPADM

## 2024-10-22 RX ORDER — IBUPROFEN 600 MG/1
600 TABLET ORAL EVERY 6 HOURS PRN
Status: DISCONTINUED | OUTPATIENT
Start: 2024-10-22 | End: 2024-10-24 | Stop reason: HOSPADM

## 2024-10-22 RX ORDER — DIPHENHYDRAMINE HYDROCHLORIDE 50 MG/ML
25 INJECTION INTRAMUSCULAR; INTRAVENOUS EVERY 4 HOURS PRN
Status: DISCONTINUED | OUTPATIENT
Start: 2024-10-22 | End: 2024-10-24 | Stop reason: HOSPADM

## 2024-10-22 RX ORDER — FENTANYL/BUPIVACAINE/NS/PF 2MCG/ML-.1
PLASTIC BAG, INJECTION (ML) INJECTION CONTINUOUS
Status: DISCONTINUED | OUTPATIENT
Start: 2024-10-22 | End: 2024-10-24 | Stop reason: HOSPADM

## 2024-10-22 RX ORDER — LABETALOL HYDROCHLORIDE 5 MG/ML
80 INJECTION, SOLUTION INTRAVENOUS ONCE AS NEEDED
Status: DISCONTINUED | OUTPATIENT
Start: 2024-10-22 | End: 2024-10-24 | Stop reason: HOSPADM

## 2024-10-22 RX ORDER — HYDROCODONE BITARTRATE AND ACETAMINOPHEN 500; 5 MG/1; MG/1
TABLET ORAL
Status: DISCONTINUED | OUTPATIENT
Start: 2024-10-22 | End: 2024-10-22

## 2024-10-22 RX ORDER — CARBOPROST TROMETHAMINE 250 UG/ML
250 INJECTION, SOLUTION INTRAMUSCULAR
Status: DISCONTINUED | OUTPATIENT
Start: 2024-10-22 | End: 2024-10-24 | Stop reason: HOSPADM

## 2024-10-22 RX ORDER — LABETALOL HYDROCHLORIDE 5 MG/ML
80 INJECTION, SOLUTION INTRAVENOUS ONCE AS NEEDED
Status: DISCONTINUED | OUTPATIENT
Start: 2024-10-22 | End: 2024-10-22

## 2024-10-22 RX ORDER — FLUTICASONE PROPIONATE 50 MCG
2 SPRAY, SUSPENSION (ML) NASAL DAILY
Status: DISCONTINUED | OUTPATIENT
Start: 2024-10-22 | End: 2024-10-22

## 2024-10-22 RX ORDER — MISOPROSTOL 200 UG/1
400 TABLET ORAL EVERY 6 HOURS
Status: COMPLETED | OUTPATIENT
Start: 2024-10-22 | End: 2024-10-23

## 2024-10-22 RX ORDER — OXYTOCIN-SODIUM CHLORIDE 0.9% IV SOLN 30 UNIT/500ML 30-0.9/5 UT/ML-%
0-32 SOLUTION INTRAVENOUS CONTINUOUS
Status: DISCONTINUED | OUTPATIENT
Start: 2024-10-22 | End: 2024-10-22

## 2024-10-22 RX ORDER — DIPHENOXYLATE HYDROCHLORIDE AND ATROPINE SULFATE 2.5; .025 MG/1; MG/1
2 TABLET ORAL EVERY 6 HOURS PRN
Status: DISCONTINUED | OUTPATIENT
Start: 2024-10-22 | End: 2024-10-22

## 2024-10-22 RX ORDER — LABETALOL HYDROCHLORIDE 5 MG/ML
20 INJECTION, SOLUTION INTRAVENOUS ONCE AS NEEDED
Status: DISCONTINUED | OUTPATIENT
Start: 2024-10-22 | End: 2024-10-22

## 2024-10-22 RX ORDER — FENTANYL/BUPIVACAINE/NS/PF 2MCG/ML-.1
PLASTIC BAG, INJECTION (ML) INJECTION CONTINUOUS PRN
Status: DISCONTINUED | OUTPATIENT
Start: 2024-10-22 | End: 2024-10-22

## 2024-10-22 RX ORDER — DIPHENHYDRAMINE HYDROCHLORIDE 50 MG/ML
12.5 INJECTION INTRAMUSCULAR; INTRAVENOUS EVERY 4 HOURS PRN
Status: DISCONTINUED | OUTPATIENT
Start: 2024-10-22 | End: 2024-10-24 | Stop reason: HOSPADM

## 2024-10-22 RX ORDER — LIDOCAINE HCL/EPINEPHRINE/PF 2%-1:200K
VIAL (ML) INJECTION
Status: DISCONTINUED | OUTPATIENT
Start: 2024-10-22 | End: 2024-10-22

## 2024-10-22 RX ORDER — PHENYLEPHRINE HYDROCHLORIDE 10 MG/ML
INJECTION INTRAVENOUS
Status: DISCONTINUED | OUTPATIENT
Start: 2024-10-22 | End: 2024-10-22

## 2024-10-22 RX ORDER — CALCIUM GLUCONATE 98 MG/ML
1 INJECTION, SOLUTION INTRAVENOUS
Status: DISCONTINUED | OUTPATIENT
Start: 2024-10-22 | End: 2024-10-24 | Stop reason: HOSPADM

## 2024-10-22 RX ORDER — SODIUM CHLORIDE 9 MG/ML
INJECTION, SOLUTION INTRAVENOUS CONTINUOUS
Status: DISCONTINUED | OUTPATIENT
Start: 2024-10-22 | End: 2024-10-24 | Stop reason: HOSPADM

## 2024-10-22 RX ORDER — DIPHENHYDRAMINE HCL 25 MG
25 CAPSULE ORAL EVERY 4 HOURS PRN
Status: DISCONTINUED | OUTPATIENT
Start: 2024-10-22 | End: 2024-10-24 | Stop reason: HOSPADM

## 2024-10-22 RX ORDER — ONDANSETRON 8 MG/1
8 TABLET, ORALLY DISINTEGRATING ORAL EVERY 8 HOURS PRN
Status: DISCONTINUED | OUTPATIENT
Start: 2024-10-22 | End: 2024-10-24 | Stop reason: HOSPADM

## 2024-10-22 RX ORDER — OXYTOCIN-SODIUM CHLORIDE 0.9% IV SOLN 30 UNIT/500ML 30-0.9/5 UT/ML-%
95 SOLUTION INTRAVENOUS ONCE AS NEEDED
Status: DISCONTINUED | OUTPATIENT
Start: 2024-10-22 | End: 2024-10-24 | Stop reason: HOSPADM

## 2024-10-22 RX ORDER — LOPERAMIDE HYDROCHLORIDE 2 MG/1
2 CAPSULE ORAL 4 TIMES DAILY PRN
Status: DISCONTINUED | OUTPATIENT
Start: 2024-10-22 | End: 2024-10-24 | Stop reason: HOSPADM

## 2024-10-22 RX ORDER — HYDRALAZINE HYDROCHLORIDE 20 MG/ML
10 INJECTION INTRAMUSCULAR; INTRAVENOUS ONCE AS NEEDED
Status: COMPLETED | OUTPATIENT
Start: 2024-10-22 | End: 2024-10-22

## 2024-10-22 RX ORDER — MISOPROSTOL 200 UG/1
800 TABLET ORAL ONCE AS NEEDED
Status: DISCONTINUED | OUTPATIENT
Start: 2024-10-22 | End: 2024-10-24 | Stop reason: HOSPADM

## 2024-10-22 RX ORDER — HYDROCORTISONE 25 MG/G
CREAM TOPICAL 3 TIMES DAILY PRN
Status: DISCONTINUED | OUTPATIENT
Start: 2024-10-22 | End: 2024-10-24 | Stop reason: HOSPADM

## 2024-10-22 RX ORDER — OXYTOCIN-SODIUM CHLORIDE 0.9% IV SOLN 30 UNIT/500ML 30-0.9/5 UT/ML-%
30 SOLUTION INTRAVENOUS ONCE AS NEEDED
Status: DISCONTINUED | OUTPATIENT
Start: 2024-10-22 | End: 2024-10-24 | Stop reason: HOSPADM

## 2024-10-22 RX ORDER — OXYCODONE AND ACETAMINOPHEN 10; 325 MG/1; MG/1
1 TABLET ORAL EVERY 4 HOURS PRN
Status: DISCONTINUED | OUTPATIENT
Start: 2024-10-22 | End: 2024-10-24 | Stop reason: HOSPADM

## 2024-10-22 RX ADMIN — CETIRIZINE HYDROCHLORIDE 5 MG: 5 TABLET, FILM COATED ORAL at 09:10

## 2024-10-22 RX ADMIN — DEXTROSE 2 G: 50 INJECTION, SOLUTION INTRAVENOUS at 06:10

## 2024-10-22 RX ADMIN — Medication 2 MILLI-UNITS/MIN: at 09:10

## 2024-10-22 RX ADMIN — LOPERAMIDE HYDROCHLORIDE 2 MG: 2 CAPSULE ORAL at 10:10

## 2024-10-22 RX ADMIN — FAMOTIDINE 20 MG: 10 INJECTION, SOLUTION INTRAVENOUS at 08:10

## 2024-10-22 RX ADMIN — METOCLOPRAMIDE 10 MG: 5 INJECTION, SOLUTION INTRAMUSCULAR; INTRAVENOUS at 08:10

## 2024-10-22 RX ADMIN — MAGNESIUM SULFATE HEPTAHYDRATE 2 G/HR: 40 INJECTION, SOLUTION INTRAVENOUS at 10:10

## 2024-10-22 RX ADMIN — Medication 12 ML/HR: at 03:10

## 2024-10-22 RX ADMIN — SODIUM CHLORIDE: 0.9 INJECTION, SOLUTION INTRAVENOUS at 08:10

## 2024-10-22 RX ADMIN — HYDRALAZINE HYDROCHLORIDE 10 MG: 20 INJECTION INTRAMUSCULAR; INTRAVENOUS at 06:10

## 2024-10-22 RX ADMIN — MISOPROSTOL 50 MCG: 100 TABLET ORAL at 01:10

## 2024-10-22 RX ADMIN — MISOPROSTOL 50 MCG: 100 TABLET ORAL at 05:10

## 2024-10-22 RX ADMIN — HYDRALAZINE HYDROCHLORIDE 5 MG: 20 INJECTION INTRAMUSCULAR; INTRAVENOUS at 06:10

## 2024-10-22 RX ADMIN — FLUTICASONE PROPIONATE 100 MCG: 50 SPRAY, METERED NASAL at 08:10

## 2024-10-22 RX ADMIN — TRANEXAMIC ACID 1000 MG: 100 INJECTION, SOLUTION INTRAVENOUS at 06:10

## 2024-10-22 RX ADMIN — LABETALOL HYDROCHLORIDE 300 MG: 200 TABLET, FILM COATED ORAL at 08:10

## 2024-10-22 RX ADMIN — MISOPROSTOL 400 MCG: 200 TABLET ORAL at 11:10

## 2024-10-22 RX ADMIN — SODIUM CHLORIDE: 0.9 INJECTION, SOLUTION INTRAVENOUS at 06:10

## 2024-10-22 RX ADMIN — PHENYLEPHRINE HYDROCHLORIDE 100 MCG: 10 INJECTION INTRAVENOUS at 06:10

## 2024-10-22 RX ADMIN — CARBOPROST TROMETHAMINE 250 MCG: 250 INJECTION, SOLUTION INTRAMUSCULAR at 07:10

## 2024-10-22 RX ADMIN — MISOPROSTOL 800 MCG: 200 TABLET ORAL at 06:10

## 2024-10-22 RX ADMIN — BUTALBITAL, ACETAMINOPHEN, AND CAFFEINE 1 TABLET: 325; 50; 40 TABLET ORAL at 05:10

## 2024-10-22 RX ADMIN — LIDOCAINE HYDROCHLORIDE,EPINEPHRINE BITARTRATE 10 MG: 20; .005 INJECTION, SOLUTION EPIDURAL; INFILTRATION; INTRACAUDAL; PERINEURAL at 06:10

## 2024-10-22 NOTE — PROGRESS NOTES
Patient doing well s/p epidural. Headache is resolved and sinus pressure is better     Temp:  [98.8 °F (37.1 °C)]   Pulse:  [64-92]   BP: (111-164)/()   SpO2:  [87 %-97 %]     Cervix 4/70/-1, vertex   AROM clear fluid at 1230    FHT: 120/mod oswald/+accel -decel   Tcoco: Q2-3 minutes     A/P: 29  at 36 4/7 wga  HTN with superimposed pre-e: dx this pregnancy, on magnesium, continue labetalol 300 BID and IV antihypertensives PRN    S/p BMZ 10/12-  S/p 4 doses of cytotec and currently on pitocin  GDM: metformin 1000 QHS, hold in labor, check BS Q4   Marginal cord insertion   Anemia  Asthma, as a child, no issues as an adult  Anxiety: stable

## 2024-10-22 NOTE — ANESTHESIA PROCEDURE NOTES
Epidural    Patient location during procedure: OB   Reason for block: primary anesthetic   Reason for block: labor analgesia requested by patient and obstetrician  Diagnosis: iup   Start time: 10/22/2024 3:20 AM  Timeout: 10/22/2024 3:18 AM  End time: 10/22/2024 3:35 AM    Staffing  Performing Provider: Linda Akhtar CRNA  Authorizing Provider: Bobby Rangel MD    Staffing  Performed by: Linda Akhtar CRNA  Authorized by: Bobby Rangel MD        Preanesthetic Checklist  Completed: patient identified, IV checked, site marked, risks and benefits discussed, surgical consent, monitors and equipment checked, pre-op evaluation, timeout performed, anesthesia consent given, hand hygiene performed and patient being monitored  Preparation  Patient position: sitting  Prep: ChloraPrep  Patient monitoring: ECG, Pulse Ox and Blood Pressure  Reason for block: primary anesthetic   Epidural  Skin Anesthetic: lidocaine 1%  Skin Wheal: 3 mL  Administration type: continuous  Approach: midline  Interspace: L4-5    Injection technique: SUSHMA saline  Needle and Epidural Catheter  Needle type: Tuohy   Needle gauge: 17  Needle length: 3.5 inches  Needle insertion depth: 7 cm  Catheter type: Mumart  Catheter size: 19 G  Catheter at skin depth: 12 cm  Insertion Attempts: 1  Test dose: 3 mL of lidocaine 1.5% with Epi 1-to-200,000  Additional Documentation: incremental injection, no paresthesia on injection, no significant pain on injection, no significant complaints from patient, no signs/symptoms of IV or SA injection and negative aspiration for heme and CSF  Needle localization: anatomical landmarks

## 2024-10-22 NOTE — NURSING
Report received from CHELSEY Peterson.   Rapport established with patient, medications reviewed, questions answered. Bed in low position, call light in reach. Dr Welch updated on patient assessment. Orders discussed and modified for Cytotec 50 mg q 4 hours.

## 2024-10-22 NOTE — ANESTHESIA PREPROCEDURE EVALUATION
10/22/2024  HPI:   Swetha Lomeli is a 29 y.o. female  at 36 3/7 EGA who presents here for elevated blood pressures. No CP/SOB/scotoma or RUQ pain. Her BP were >160/110 since last night. Took her labetalol last night as scheduled.       ROS:  neg          Past Medical History:   Diagnosis Date    Anxiety      Asthma      Migraine      Seizures        No past surgical history on file.         Family History   Problem Relation Name Age of Onset    Breast cancer Paternal Grandmother   75    Heart disease Maternal Grandfather        Colon cancer Neg Hx        Ovarian cancer Neg Hx          Allergies: Patient has no known allergies.  Social History            Socioeconomic History    Marital status: Significant Other   Tobacco Use    Smoking status: Former    Smokeless tobacco: Never   Substance and Sexual Activity    Alcohol use: Not Currently       Comment: occasional     Drug use: No    Sexual activity: Yes       Partners: Male       Birth control/protection: None        Pre-op Assessment    I have reviewed the Patient Summary Reports.     I have reviewed the Nursing Notes. I have reviewed the NPO Status.   I have reviewed the Medications.     Review of Systems  Anesthesia Hx:               Denies Personal Hx of Anesthesia complications.                    Cardiovascular:     Hypertension                                          Pulmonary:    Asthma                    Renal/:  Renal/ Normal                 Hepatic/GI:  Hepatic/GI Normal                    Neurological:      Headaches                                 Endocrine:        Obesity / BMI > 30      Physical Exam  General: Cooperative, Alert and Oriented    Airway:  Mallampati: II         Anesthesia Plan  Type of Anesthesia, risks & benefits discussed:    Anesthesia Type: Epidural  Intra-op Monitoring Plan: Standard ASA  Monitors  Informed Consent: Informed consent signed with the Patient and all parties understand the risks and agree with anesthesia plan.  All questions answered.   ASA Score: 3  Day of Surgery Review of History & Physical: H&P Update referred to the surgeon/provider.    Ready For Surgery From Anesthesia Perspective.     .

## 2024-10-22 NOTE — NURSING
Dr. James at the bedside. SVE 4/80/-1, AROM clear fluid @ 1212.    Patient and family updated on poc. FHR remains reassuring at this time. Repositioning and alternating side lying and peanut ball to facilitate labor progress. VSS. Patient reports no pain or discomfort at this time. Call bell within reach.

## 2024-10-22 NOTE — PROGRESS NOTES
Patient doing well s/p epidural. Headache is more frontal sinus, has sinus issues and normally uses flonase. Has been having a feeling of something caught in the back of her throat, like her throat is swollen, has been there her whole pregnancy, worse at night.     Pulse:  [80-97]   BP: (136-143)/(86-95)   SpO2:  [97 %]     Cervix 3/70/-2, vertex     FHT: 140/mod oswald/+accel -decel   Tcoco: Q2-3 minutes     A/P: 29  at 36 4/7 wga  HTN with superimposed pre-e: dx this pregnancy, on magnesium, continue labetalol 300 BID and IV antihypertensives PRN    S/p BMZ 10/12-  GDM: metformin 1000 QHS, hold in labor, check BS Q4   Marginal cord insertion   Anemia  Asthma, as a child, no issues as an adult  Anxiety: stable  Will try pepcid and sinus medications for HA and throat issues and monitor closely

## 2024-10-23 LAB
ALBUMIN SERPL BCP-MCNC: 2.3 G/DL (ref 3.5–5.2)
ALP SERPL-CCNC: 109 U/L (ref 40–150)
ALT SERPL W/O P-5'-P-CCNC: 10 U/L (ref 10–44)
ANION GAP SERPL CALC-SCNC: 10 MMOL/L (ref 8–16)
AST SERPL-CCNC: 22 U/L (ref 10–40)
BASOPHILS # BLD AUTO: 0.02 K/UL (ref 0–0.2)
BASOPHILS NFR BLD: 0.1 % (ref 0–1.9)
BILIRUB SERPL-MCNC: 0.9 MG/DL (ref 0.1–1)
BUN SERPL-MCNC: 11 MG/DL (ref 6–20)
CALCIUM SERPL-MCNC: 7.1 MG/DL (ref 8.7–10.5)
CHLORIDE SERPL-SCNC: 104 MMOL/L (ref 95–110)
CO2 SERPL-SCNC: 18 MMOL/L (ref 23–29)
CREAT SERPL-MCNC: 0.7 MG/DL (ref 0.5–1.4)
DIFFERENTIAL METHOD BLD: ABNORMAL
EOSINOPHIL # BLD AUTO: 0.1 K/UL (ref 0–0.5)
EOSINOPHIL NFR BLD: 0.5 % (ref 0–8)
ERYTHROCYTE [DISTWIDTH] IN BLOOD BY AUTOMATED COUNT: 16.4 % (ref 11.5–14.5)
EST. GFR  (NO RACE VARIABLE): >60 ML/MIN/1.73 M^2
GLUCOSE SERPL-MCNC: 114 MG/DL (ref 70–110)
HCT VFR BLD AUTO: 25.1 % (ref 37–48.5)
HGB BLD-MCNC: 8 G/DL (ref 12–16)
IMM GRANULOCYTES # BLD AUTO: 0.08 K/UL (ref 0–0.04)
IMM GRANULOCYTES NFR BLD AUTO: 0.5 % (ref 0–0.5)
LYMPHOCYTES # BLD AUTO: 1.7 K/UL (ref 1–4.8)
LYMPHOCYTES NFR BLD: 9.4 % (ref 18–48)
MCH RBC QN AUTO: 26.6 PG (ref 27–31)
MCHC RBC AUTO-ENTMCNC: 31.9 G/DL (ref 32–36)
MCV RBC AUTO: 83 FL (ref 82–98)
MONOCYTES # BLD AUTO: 0.9 K/UL (ref 0.3–1)
MONOCYTES NFR BLD: 5.3 % (ref 4–15)
NEUTROPHILS # BLD AUTO: 14.9 K/UL (ref 1.8–7.7)
NEUTROPHILS NFR BLD: 84.2 % (ref 38–73)
NRBC BLD-RTO: 0 /100 WBC
PLATELET # BLD AUTO: 161 K/UL (ref 150–450)
PMV BLD AUTO: 12 FL (ref 9.2–12.9)
POTASSIUM SERPL-SCNC: 3.6 MMOL/L (ref 3.5–5.1)
PROT SERPL-MCNC: 5.3 G/DL (ref 6–8.4)
RBC # BLD AUTO: 3.01 M/UL (ref 4–5.4)
SODIUM SERPL-SCNC: 132 MMOL/L (ref 136–145)
WBC # BLD AUTO: 17.71 K/UL (ref 3.9–12.7)

## 2024-10-23 PROCEDURE — 11000001 HC ACUTE MED/SURG PRIVATE ROOM

## 2024-10-23 PROCEDURE — 80053 COMPREHEN METABOLIC PANEL: CPT | Performed by: OBSTETRICS & GYNECOLOGY

## 2024-10-23 PROCEDURE — 85025 COMPLETE CBC W/AUTO DIFF WBC: CPT | Performed by: OBSTETRICS & GYNECOLOGY

## 2024-10-23 PROCEDURE — 36415 COLL VENOUS BLD VENIPUNCTURE: CPT | Performed by: OBSTETRICS & GYNECOLOGY

## 2024-10-23 PROCEDURE — 25000003 PHARM REV CODE 250: Performed by: OBSTETRICS & GYNECOLOGY

## 2024-10-23 PROCEDURE — 99231 SBSQ HOSP IP/OBS SF/LOW 25: CPT | Mod: ,,, | Performed by: OBSTETRICS & GYNECOLOGY

## 2024-10-23 PROCEDURE — 72100003 HC LABOR CARE, EA. ADDL. 8 HRS

## 2024-10-23 RX ORDER — DIPHENOXYLATE HYDROCHLORIDE AND ATROPINE SULFATE 2.5; .025 MG/1; MG/1
2 TABLET ORAL ONCE AS NEEDED
Status: COMPLETED | OUTPATIENT
Start: 2024-10-23 | End: 2024-10-23

## 2024-10-23 RX ORDER — DIPHENOXYLATE HYDROCHLORIDE AND ATROPINE SULFATE 2.5; .025 MG/1; MG/1
1 TABLET ORAL EVERY 4 HOURS PRN
Status: DISCONTINUED | OUTPATIENT
Start: 2024-10-23 | End: 2024-10-24 | Stop reason: HOSPADM

## 2024-10-23 RX ADMIN — DIPHENOXYLATE HYDROCHLORIDE AND ATROPINE SULFATE 2 TABLET: 2.5; .025 TABLET ORAL at 04:10

## 2024-10-23 RX ADMIN — ACETAMINOPHEN 650 MG: 325 TABLET ORAL at 06:10

## 2024-10-23 RX ADMIN — IBUPROFEN 600 MG: 600 TABLET, FILM COATED ORAL at 11:10

## 2024-10-23 RX ADMIN — IBUPROFEN 600 MG: 600 TABLET, FILM COATED ORAL at 06:10

## 2024-10-23 RX ADMIN — MISOPROSTOL 400 MCG: 200 TABLET ORAL at 06:10

## 2024-10-23 NOTE — NURSING
2nd unit prbc infused, vsx stable. No c/o, nad. Line flushed, then changed to ns, not infusing. Magnesium restarted at 2g/hr per pump.

## 2024-10-23 NOTE — ANESTHESIA POSTPROCEDURE EVALUATION
Anesthesia Post Evaluation    Patient: Swetha Lomeli    Procedure(s) Performed: Procedure(s) (LRB):  DILATION AND CURETTAGE, UTERUS (Bilateral)    Final Anesthesia Type: epidural      Patient location during evaluation: labor & delivery  Patient participation: Yes- Able to Participate  Level of consciousness: awake and alert  Post-procedure vital signs: reviewed and stable  Pain management: adequate  Airway patency: patent    PONV status at discharge: No PONV  Anesthetic complications: no      Cardiovascular status: stable  Respiratory status: room air  Hydration status: euvolemic  Follow-up not needed.              Vitals Value Taken Time   /63 10/23/24 0558   Temp 36.9 °C (98.5 °F) 10/22/24 2337   Pulse 95 10/23/24 0620   Resp 16 10/23/24 0622   SpO2 95 % 10/23/24 0618   Vitals shown include unfiled device data.      Event Time   Out of Recovery 10/22/2024 21:30:00         Pain/Philip Score: Pain Rating Prior to Med Admin: 0 (10/22/2024  8:15 PM)

## 2024-10-23 NOTE — NURSING
Pt ambulated to bathroom for diarrhea. Ambulates steadily w/ assist. Vsx stable; low grade temp noted. Immodium po for diarrhea.

## 2024-10-23 NOTE — NURSING
Pt assisted up to bathroom, reports worse diarrhea this time. Dr murrieta notified, order for lomotil given. 2 lomotil po for diarrhea. Continue poc. Pt denies any pain; magnesium infusing as before.

## 2024-10-23 NOTE — TRANSFER OF CARE
Anesthesia Transfer of Care Note    Patient: Swetha Lomeli    Procedure(s) Performed: Procedure(s) (LRB):  DILATION AND CURETTAGE, UTERUS (Bilateral)    Patient location: Labor and Delivery    Anesthesia Type: general and epidural    Transport from OR: Transported from OR on room air with adequate spontaneous ventilation. Transported from OR on 2-3 L/min O2 by NC with adequate spontaneous ventilation    Post pain: adequate analgesia    Post assessment: no apparent anesthetic complications    Post vital signs: stable    Level of consciousness: awake, alert and oriented    Nausea/Vomiting: no nausea/vomiting    Complications: none    Transfer of care protocol was followed      Last vitals: Visit Vitals  /80   Pulse 97   Temp 37.1 °C (98.8 °F) (Oral)   LMP 01/14/2024   SpO2 95%   Breastfeeding No

## 2024-10-23 NOTE — L&D DELIVERY NOTE
[facility] - Labor & Delivery  Vaginal Delivery   Operative Note    SUMMARY     Normal spontaneous vaginal delivery of live female infant, mo pushed several times and head delivered, left shoulder delivered easily with next push and baby was placed on mothers abdomen for skin to skin and bulb suctioning performed.  Infant delivered position OA over intact perineum.  Nuchal cord: No.    After 30 minutes placenta was not delivered. Mom pushed several times and placenta did not deliver. Blood loss about 500 cc at this point. Discussed with mom choices to try manual extraction vs go to the OR for a D&C and she desired manual extraction. With gentle traction at the cord insertion the placenta did not come so decision made to go back to the OR for suction D&C. Please see operative note.       Indications:  (spontaneous vaginal delivery)  Pregnancy complicated by:   Patient Active Problem List   Diagnosis    Other convulsions    ASCUS with positive high risk HPV cervical     (spontaneous vaginal delivery)     Admitting GA: 36w4d    Delivery Information for Mayra Lomeli    Birth information:  YOB: 2024   Time of birth: 5:33 PM   Sex: female   Head Delivery Date/Time: 10/22/2024  5:33 PM   Delivery type: Vaginal, Spontaneous   Gestational Age: 36w4d       Delivery Providers    Delivering clinician: Fariba James MD   Provider Role    Krystle Lynn RN Delivery Nurse    Priscilla Tate RN Transition Nursery              Measurements    Weight:   Length:          Apgars    Living status: Living  Apgar Component Scores:  1 min.:  5 min.:  10 min.:  15 min.:  20 min.:    Skin color:         Heart rate:         Reflex irritability:         Muscle tone:         Respiratory effort:         Total:                  Operative Delivery    Forceps attempted?: No  Vacuum extractor attempted?: No         Shoulder Dystocia    Shoulder dystocia present?: No           Presentation    Presentation:  Vertex  Position: Middle Occiput Posterior           Interventions/Resuscitation    Method: Tactile Stimulation       Cord    Vessels: 3 vessels  Complications: None  Delayed Cord Clamping?: Yes  Cord Clamped Date/Time: 10/22/2024  5:36 PM  Cord Blood Disposition: Lab  Gases Sent?: Yes  Stem Cell Collection (by MD): No       Placenta    Placenta delivery date/time:   Placenta removal:            Labor Events:       labor: Yes     Labor Onset Date/Time:         Dilation Complete Date/Time:         Start Pushing Date/Time:         Start Pushing Date/Time:       Rupture Date/Time:            Rupture type:          Fluid Amount:       Fluid Color:                steroids: None     Antibiotics given for GBS: No     Induction: misoprostol     Indications for induction:  Hypertension;Mild Preeclampsia     Augmentation: oxytocin     Indications for augmentation: Ineffective Contraction Pattern     Labor complications: None     Additional complications:          Cervical ripening:                     Delivery:      Episiotomy: None     Indication for Episiotomy:       Perineal Lacerations: None Repaired:      Periurethral Laceration:   Repaired:     Labial Laceration:   Repaired:     Sulcus Laceration:   Repaired:     Vaginal Laceration:   Repaired:     Cervical Laceration:   Repaired:     Repair suture:       Repair # of packets:       Last Value - EBL - Nursing (mL):       Sum - EBL - Nursing (mL): 0     Last Value - EBL - Anesthesia (mL):      Calculated QBL (mL):       Running total QBL (mL):       Vaginal Sweep Performed:       Surgicount Correct:       Vaginal Packing:   Quantity:       Other providers:       Anesthesia    Method: Epidural          Details (if applicable):  Trial of Labor      Categorization:      Priority:     Indications for :     Incision Type:       Additional  information:  Forceps:    Vacuum:    Breech:    Observed anomalies    Other (Comments):

## 2024-10-23 NOTE — PROGRESS NOTES
10/23/2024      Patient is doing well with no complaints. Tolerating Po without N/V. Had loose stools overnight but seems to have resolved after lomotil. Ambulated without difficulty. Pain controlled with pain medications. Lochia is less than menses, scant this am. Is breastfeeding but hasn't yet.     Temp:  [98.5 °F (36.9 °C)-100.3 °F (37.9 °C)] 99.8 °F (37.7 °C)  Pulse:  [0-112] 100  Resp:  [16] 16  SpO2:  [84 %-99 %] 96 %  BP: ()/(54-96) 133/61      In bed, NAD, RRR, CTA B, abd S/NT/ND + BS FF and below umbilicus, ext: 2+ LE  edema or tenderness     UOP: 140cc/hr overnight     A/P: 29 y.o.   s/p  with D&C for retained placenta. PPD 1      1. Continue routine care  2. HTN with superimposed pre-e: 12 hours s/p , diuresed well, BP and reflexes normal will stop mag and monitor. Will hold home BP medications for now and likely restart this afternoon.   3. PPH 2/2 retained placenta: s/p TXA, cytotec, hemabate and pitocin. Bleeding light this am. Will monitor bleeding closely, low threshold to get US if needed.   4. GDM: was on metformin, will get accucheck fasting tomorrow am and do 6 week GTT  5. Hyponatremia: most likely due to free water with magnesium, asymptomatic will monitor and repeat   6. Hypocalcemia: corrected to 8.5, will monitor and replace if still low after repeat.

## 2024-10-23 NOTE — OP NOTE
10/22/2024    Procedure: suction dilation and currettage     Pre-Op Diagnosis: retained placenta     Post-Op Diagnosis: same     Anesthesia: epidural     Complications: None    Condition: Stable    EBL:600 cc in OR and 700 in L&D room for a total of 1300     Primary Surgeon: Fariba James    Assistant: none     Findings: retained placenta     Specimen: placenta     TECHNIQUE: The patient was taken to the Operating Room where her epidural anesthesia  was found to be adequate. Her legs were placed in [place] stirrups. She was   then prepared and draped in normal sterile fashion.     Manual extraction was used to remove pieces of the placenta.   Then an weighted speculum was placed into the vagina and a right angle retractor was used to visualize the cervix. Ring forceps was used to grasp cervix     The suction was advanced to the fundus and rotated in a circumferential fashion. Retained placenta  were removed. Another manual extraction was done and more placenta was removed. The suction was advanced again to the fundus. A sharp currette was advanced to the fundus and uterine curretage was preformed until the endometrium had a good uterine cry. Bleeding was minimal. The currettings were removed from the field and sent to pathology. She was given cytotec 800mcg rectally and hemabate IM x1.     The patient tolerated the procedure well. All instruments were then removed   from the vagina. The patient was then taken to the L&D in stable condition.     all other ROS negative except as per HPI

## 2024-10-23 NOTE — NURSING
Cervix found to be completely dilated at 1640. Dr. James notified. Started pushing with patient at 1658. Time of birth 1733. Baby placed on mother's chest for skin to skin. 30 minutes post-delivery, attempts were made to push the placenta out, then manual extraction was attempted without success. Decision was made to transfer to the OR for suction D&C at approximately 1759. Patient received two units of blood due to anemic state. EBL in labor room was 700mL. EBL in OR was 600mL. Report given to night shift.

## 2024-10-23 NOTE — NURSING
Tolerating regular diet, ambulating freely without dizziness, and voiding spontaneously. POC reviewed with pt. Positive bonding noted between pt and infant; BF spontaneously and expressing EBM. Vss and nad noted. Safety precautions maintained; bed in low position, wheels locked, side rails up x2, and call light within reach. Will continue with POC.

## 2024-10-23 NOTE — NURSING
Pt to recovery post suction d&c. Pt aaox3, no c/o. 2nd unit prbc's infusing to iv#1. Pit per gravity, changed to 95cc/hr per pump. Gross patent, draining clear, slight pink tinged urine to urometer bag. Resp even, unlabored. Fundus firm, at umb, scant lochia rubra, pad in place. Epidural cath in place, intact. Dr mercedes in to see pt. Placenta to pathology. S2s initiated.

## 2024-10-24 VITALS
HEART RATE: 101 BPM | DIASTOLIC BLOOD PRESSURE: 93 MMHG | OXYGEN SATURATION: 98 % | SYSTOLIC BLOOD PRESSURE: 140 MMHG | TEMPERATURE: 98 F | RESPIRATION RATE: 19 BRPM

## 2024-10-24 PROCEDURE — 25000003 PHARM REV CODE 250: Performed by: OBSTETRICS & GYNECOLOGY

## 2024-10-24 PROCEDURE — 99238 HOSP IP/OBS DSCHRG MGMT 30/<: CPT | Mod: ,,, | Performed by: OBSTETRICS & GYNECOLOGY

## 2024-10-24 RX ORDER — LABETALOL 200 MG/1
200 TABLET, FILM COATED ORAL EVERY 12 HOURS
Status: DISCONTINUED | OUTPATIENT
Start: 2024-10-24 | End: 2024-10-24 | Stop reason: HOSPADM

## 2024-10-24 RX ORDER — IBUPROFEN 800 MG/1
800 TABLET ORAL EVERY 8 HOURS PRN
Qty: 30 TABLET | Refills: 2 | Status: SHIPPED | OUTPATIENT
Start: 2024-10-24 | End: 2025-10-24

## 2024-10-24 RX ADMIN — IBUPROFEN 600 MG: 600 TABLET, FILM COATED ORAL at 01:10

## 2024-10-24 RX ADMIN — LABETALOL HYDROCHLORIDE 200 MG: 200 TABLET, FILM COATED ORAL at 08:10

## 2024-10-24 RX ADMIN — IBUPROFEN 600 MG: 600 TABLET, FILM COATED ORAL at 08:10

## 2024-10-24 RX ADMIN — LABETALOL HYDROCHLORIDE 300 MG: 200 TABLET, FILM COATED ORAL at 04:10

## 2024-10-24 RX ADMIN — ACETAMINOPHEN 650 MG: 325 TABLET ORAL at 01:10

## 2024-10-24 NOTE — DISCHARGE SUMMARY
Admitting Diagnosis: iup at 36 wga, HTN with superimposed pre-e, GDM, anemia  Discharge Diagnosis: same with  and D&C 2/2 retained placenta  Procedure:   Service: OB-GYN, Fariba James   Consults: none   Hospital Course: Patient was admitted to L&D for IOL secondary to ore-e superimposed. She had a vaginal delivery complicated by retained placenta and need for D&C of a viable female infant.   She had 2 units of blood and her H/H was stable. She was transferred to [facility] baby in stable condition. Magnesium was stopped day 1 pp and her BP were mild range on her medications. She had no pre-e symptoms. Her recovery was uncomplicated and by post-partum day 2 she was tolerating PO without N/V, ambulating without difficulty, her pain was well controlled with PO pain medications and she had passed flatus. She was both breast and bottlefeeding.  She was stable and ready for discharge.   Medications: OTC ibuprofen, labetalol   Disposition: home   Condition: stable for discharge  Follow up:  2-3 days for BP check  Instructions: continue ambulation, take medications as needed and take stool softener as needed, pelvic rest until instructed otherwise by physician  Call or return to ED for fever >100.4, foul smelling vaginal discharge, heavy vaginal bleeding, abdominal pain not responsive to medications or other concerns.

## 2024-10-24 NOTE — DISCHARGE INSTRUCTIONS
Breastfeeding Discharge Instructions      Feed the baby at the earliest sign of hunger or comfort  Hands to mouth, sucking motions  Rooting or searching for something to suck on  Dont wait for crying - it is a sign of distress    The feedings may be 8-12 times per 24hrs and will not follow a schedule  Avoid pacifiers and bottles for the first 4 weeks  Alternate the breast you start the feeding with, or start with the breast that feels the fullest  Switch breasts when the baby takes himself off the breast or falls asleep  Keep offering breasts until the baby looks full, no longer gives hunger signs, and stays asleep when placed on his back in the crib  If the baby is sleepy and wont wake for a feeding, put the baby skin-to-skin dressed in a diaper against the mothers bare chest  Sleep near your baby  The baby should be positioned and latched on to the breast correctly  Chest-to-chest, chin in the breast  Babys lips are flipped outward  Babys mouth is stretched open wide like a shout  Babys sucking should feel like tugging to the mother  The baby should be drinking at the breast:  You should hear swallowing or gulping throughout the feeding  You should see milk on the babys lips when he comes off the breast  Your breasts should be softer when the baby is finished feeding  The baby should look relaxed at the end of feedings  After the 4th day and your milk is in:  The babys poop should turn bright yellow and be loose, watery, and seedy  The baby should have at least 3-4 poops the size of the palm of your hand per day  The baby should have at least 5-6 wet diapers per day  The urine should be light yellow in color  You should drink when you are thirsty and eat a healthy diet when you are    hungry.     Take naps to get the rest you need.   Take medications and/or drink alcohol only with permission of your obstetrician    or the babys pediatrician.  You can also call the Infant Risk Center,   ([phone]),  Monday-Friday, 8am-5pm Central time, to get the most   up-to-date evidence-based information on the use of medications during   pregnancy and breastfeeding.      The baby should be examined by a pediatrician at 3-5 days of age.  Once your   milk comes in, the baby should be gaining at least ½ - 1oz each day and should be back to birthweight no later than 10-14 days of age.          Community Resources    [facility] Breastfeeding Warmline: [phone]  Local [facility] clinics: provide incentives and breastpumps to eligible mothers  La Leche League International (LLLI):  mother-to-mother support group website        www.Leikrl.CallYourPrice  Local La Leche League mother-to-mother support groups:        www.[employer]        La Leche League [facility]   Dr. Jv Rey website for latch videos and general information:        www.breastfeedinginc.ca  Infant Risk Center is a call center that provides information about the safety of taking medications while breastfeeding.  Call 1-894.296.4451, M-F, 8am-5pm, [place].  International Lactation Consultant Association provides resources for assistance:        www.ilca.org  [facility] Breastfeeding Coalition provides informationand resources for parents  and the community    http://[facility] HospitalastWorkstiring.org  Zip code search of breastfeeding resources and more:                                                                              www.LaBreastfeedingSupport.org          Veronika Katz is a mom-to-mom support group:                             www.ProPerformarafatSnapNames.com//breastfeedng-support/  Partners for Healthy Babies:  8-139-185-BABY(3484)  [employer]: a breastfeeding support group for women of color, 247.505.8704     Patient Discharge Instructions for Postpartum Women    Resume Regular Diet  Increase activity gradually, no heavy lifting  Shower  No tampons, douching or sexual intercourse.  Discuss birth control options with your physician.  Wear a support bra  Return  "to work/school when you've been cleared by a physician    Call your physician if     *Fever of 100.4 or higher  *Persistent nausea/ vomiting  *Incisional drainage  *Heavy vaginal bleeding or large clots (Heavy bleeding is soaking 1 pad in an hour)  *Swelling and pain in arms or legs  *Severe headaches, blurred vision or fainting  *Shortness of breath  *Frequency and burning with urination  *Signs of postpartum depression, discuss these signs with your physician    Call lactation services for questions regarding feeding, nipple and breast care, and general questions about lactation.  They can be reached at [phone]         Understanding Postpartum Depression    You've just had a baby.  You know you should be excited and happy.  But instead you find yourself crying for no reason.  You may have trouble coping with your daily tasks.  You feel sad, tired, and hopeless most of the time.  You may even feel ashamed or guilty.  But what you're going through is not your fault and you can feel better.  Talk to your doctor.  He or she can help.    Depression After Childbirth    You may be weepy and tired right after giving birth.  These feelings are normal.  They're sometimes called the "baby blues."  These blues go away 2-3 weeks.  However, postpartum (meaning "after birth") depression lasts much longer and is more sever than the "baby blues."  It can make you feel sad and hopeless.  You may also fear that your baby will be harmed and worry about being a bad mother.      What is Depression?    Depression is a mood disorder that affects the way you think and feel.  The most common symptom is a feeling of deep sadness.  You may also feel as if you just can't cope with life.    Other symptoms include:      * Gaining or loosing weight  * Sleeping too much or too little  * Feeling tired all the time  * Feeling restless  * Fears of harming your baby   * Lack of interest in your baby  * Feeling worthless or guilty  * No longer " finding pleasure in things you used to  * Having trouble thinking clearly or making decisions  * Thoughts of hurting yourself or your baby    What Causes Postpartum Depression    The exact causes of postpartum depression isn't known.  It may be due to changes in your hormones during and after childbirth.  You may also be tired from caring for your baby and adjusting to being a mother.  All these factors may make you feel depressed.  In some cases, your genes may also play a role.    Depression Can Be Treated    The good news is that there are many ways to treat postpartum depression.  Talking to your doctor is the first step toward feeling better.    Resources:    * National [place] of Mental Health  -- 364-652-0037    www.nimh.nih.gov    * National [place] on Mental Illness --275.853.5555    Www.allen.org    * Mental Health Yesi -- 769.349.9237     Www.nmha.org    * National Suicide Hotline --659.907.6305 (800-SUICIDE)    7727-7810 [employer]  All rights reserved.  This information is not intended as a substitute for professional medical care.  Always follow up with your healthcare professional's instructions.

## 2024-10-24 NOTE — PROGRESS NOTES
10/24/2024      Patient is doing well with no complaints. Tolerating Po without N/V. Had normal BM today. Ambulated without difficulty. Pain controlled with pain medications. Lochia is less than menses. Is breastfeeding and pumping and formula feeding     Temp:  [98 °F (36.7 °C)-98.6 °F (37 °C)] 98.3 °F (36.8 °C)  Pulse:  [] 102  Resp:  [16-18] 18  SpO2:  [97 %-99 %] 99 %  BP: (132-146)/(81-93) 140/93      In bed, NAD, RRR, CTA B, abd S/NT/ND + BS FF and below umbilicus, ext: 2+ LE  edema or tenderness         A/P: 29 y.o.   s/p  with D&C for retained placenta. PPD 2      1. Continue routine care  2. HTN with superimposed pre-e: restated labetalol 200 mg this am, BP still mild range, no symptoms, discussed monitoring tonight and doing up on BP medications but desires discharge, will go up to 300 BID and check log and send it to me tomorrow and RTC early next week, discussed pre-e precautions  3. PPH 2/2 retained placenta: s/p TXA, cytotec, hemabate and pitocin. Bleeding light this am. Will monitor bleeding closely, low threshold to get US if needed.   4. GDM: was on metformin, will do 6 week GTT

## 2024-10-24 NOTE — LACTATION NOTE
Rounded on couplet. Mom reported that she feels concerned about how she will feed baby breast milk once she goes home.  Upon rounding, offered assistance with breastfeeding and mom accepted. Requested to examine mom's breasts and mom accepted.Observed bilateral nipples to be slightly everted but retracting with compression. Breasts observed to be full, heavy, and periareolar swelling observed to bilateral breasts. Attempted to use reverse pressure softening to left areola in order to get left nipple to beata more.Also encouraged mom to wear nipple shells (currently at bedside) in between feedings to help nipples beata better.  Encouraged awakening techniques, such as unswaddling/undressing, changing baby's diaper, and placing baby skin to skin. Asked mom if she knew how to hand express and she stated that she was not sure.  Offered assistance and had mom return demonstration. Large drops of colostrum observed to left nipple. Assisted mom with providing pillow support and placed baby in football position. Instructed mom to apply nipple to baby's upper lip/nose and wait for baby to open mouth wide for deep latch. Baby latched and attempted to suck but quickly lost latch. Tried again and baby was unable to sustain latch. Mom had nipple shield at bedside. Placed nipple shield to left nipple and baby latched immediately with strong sucks and swallows observed for 15 minutes. Showed mom how to use breast compressions to aid in milk flow. Educated mom about importance of ensuring deep latch and watching for efficient sucks/swallowing. After 15 minutes baby released breast. Educated mom about use of nipple shield and how she should pump after each time she uses it. Also educated mom about importance of pumping after feedings for sake of maintaining supply with SGA  baby and providing adequate breast stimulation.  Encouraged mom to begin hand expression into primo lacto at this time. Collected 1 ml of colostrum.  Reviewed milk storage guidelines.   Encouraged mom to begin pumping at this time.   Reviewed feeding plan: breastfeed 8/+ in 24. Then hand express. Then pump every 3 hours.  Discharge teaching done. All questions answered.  Much praise and support  given to mom.      Mom has Lact. [place] Warmline number for future reference.     [facility] - Mother & Baby  Lactation Note - Mom    SUMMARY     Maternal Assessment    Breast Shape: Bilateral:, round  Breast Density: Bilateral:, filling  Areola: Bilateral:, dense (periareolar swelling)  Nipples: Bilateral:, everted, retracting (retract with compression)  Left Nipple Symptoms:  (denies pain)  Right Nipple Symptoms:  (denies pain)      LATCH Score         Breasts WDL    Breast WDL: WDL  Left Nipple Symptoms:  (denies pain)  Right Nipple Symptoms:  (denies pain)    Maternal Infant Feeding    Maternal Preparation: breast care, hand hygiene  Maternal Emotional State: anxious, assist needed  Infant Positioning: clutch/football  Signs of Milk Transfer: audible swallow, infant jaw motion present, suck/swallow ratio  Pain with Feeding: no  Comfort Measures Before/During Feeding: infant position adjusted, latch adjusted, maternal position adjusted  Milk Ejection Reflex: absent  Comfort Measures Following Feeding: air-drying encouraged, expressed milk applied  Nipple Shape After Feeding, Left: round  Latch Assistance: yes    Lactation Referrals    Community Referrals: outpatient lactation program, support group, pediatric care provider  Outpatient Lactation Program Lactation Follow-up Date/Time: call lac goyo prn  Pediatric Care Provider Lactation Follow-up Date/Time: follow up with peds within 1-2 days for wt check  Support Group Lactation Follow-up Date/Time: community resources given in avs    Lactation Interventions               Breastfeeding Session    Breast Pumping Interventions: post-feed pumping encouraged, frequent pumping encouraged  Infant Positioning:  clutch/football  Signs of Milk Transfer: audible swallow, infant jaw motion present, suck/swallow ratio    Maternal Information

## 2024-10-24 NOTE — PLAN OF CARE
Note copied from Infant's chart (MRN: [id number])     SOCIAL WORK DISCHARGE PLANNING ASSESSMENT     SW completed discharge planning assessment with pt's mother via telephone. Pt's mother was easily engaged and education on the role of  was provided. Pt's mother reported all necessities for patient were obtained, including a car seat. Pt's mother reported she has good support from family and friends and advised pt's maternal grandmother Cheri will provide assistance as needed after returning home. Pt's father will provide transportation home following discharge. No needs for community resources were reported. Pt's mother was encouraged to call with any questions or concerns. Pt's mother verbalized understanding.      Legal Name: Lizette Flor             :  10/22/2024  Address: [address] [place] [address]  Parent's Phone Numbers: pt's mother Swetha Lomeli [phone] and pt's father Carlos Flor [phone]     Pediatrician: Instructed to decide soon and inform RN       Problem List       Patient Active Problem List   Diagnosis    Single liveborn infant delivered vaginally      infant of 36 completed weeks of gestation    IDM (infant of diabetic mother)    SGA (small for gestational age)         Birth Hospital:[facility]           ROZINA: [birth year]     Birth Weight:   2.16 kg (4 lb 12.2 oz)              Birth Length: 46.4cm               Gestational Age: 36w4d           Apgars    Living status: Living  Apgar Component Scores:  1 min.:  5 min.:  10 min.:  15 min.:  20 min.:    Skin color:  1  1          Heart rate:  2  2          Reflex irritability:  2  2          Muscle tone:  2  2          Respiratory effort:  2  2          Total:  9  9          Apgars assigned by: ROSA GIFFORD RN           10/24/24 1228   OB Discharge Planning Assessment   Assessment Type Discharge Planning Assessment   Source of Information family   Verified Demographic and Insurance  Information Yes   Insurance Medicaid   Medicaid [employer]   Spiritual Affiliation [employer]    Contact Status none needed   Father's Involvement Fully Involved   Is Father signing the birth certificate Yes   Father's Address [address] [place] [address]   Family Involvement High   Primary Contact Name and Number pt's mother Swetha Lomeli [phone] and pt's father Carlos Flor [phone]   Other Contacts Names and Numbers pt's maternal grandmother Daksha Davis [phone]   Received Prenatal Care Yes   Transportation Anticipated family or friend will provide   Receive WIC Benefits Already certified, will apply for new born    Arrangements Self;Family;Friends   Infant Feeding Plan breastfeeding;formula feeding   Breast Pump Needed no   Does baby have crib or safe sleep space? Yes   Do you have a car seat? Yes   Has other essential care items? Clothing;Bottles;Diapers   Resources/Education Provided Preparing for Your Baby's Discharge Home   DCFS No indications (Indicators for Report)   Discharge Plan A Home with family

## 2024-10-24 NOTE — PLAN OF CARE
AAOx4, respirations clear and unlabored, bowel sounds present, and +2 pulses bilaterally. Tolerating regular diet, ambulating freely without dizziness, and voiding spontaneously. POC reviewed with pt and understanding expressed. Questions encouraged and answered. Positive bonding noted between pt and infant; BF spontaneously.     Vss and nad noted. Safety precautions maintained; bed in low position, wheels locked, side rails up x2, and call light within reach. Pt stable and ready for discharge; d/c teaching given to pt and SO. Pt left via wheelchair with infant in arms.

## 2024-10-25 LAB
FINAL PATHOLOGIC DIAGNOSIS: NORMAL
GROSS: NORMAL
Lab: NORMAL

## 2024-10-29 ENCOUNTER — POSTPARTUM VISIT (OUTPATIENT)
Dept: OBSTETRICS AND GYNECOLOGY | Facility: CLINIC | Age: 29
End: 2024-10-29
Payer: MEDICAID

## 2024-10-29 VITALS
DIASTOLIC BLOOD PRESSURE: 84 MMHG | HEART RATE: 90 BPM | WEIGHT: 201.81 LBS | BODY MASS INDEX: 36.91 KG/M2 | SYSTOLIC BLOOD PRESSURE: 126 MMHG

## 2024-10-29 DIAGNOSIS — Z98.890 STATUS POST D&C: ICD-10-CM

## 2024-10-29 PROCEDURE — 99999 PR PBB SHADOW E&M-EST. PATIENT-LVL III: CPT | Mod: PBBFAC,,, | Performed by: OBSTETRICS & GYNECOLOGY

## 2024-10-29 PROCEDURE — 99213 OFFICE O/P EST LOW 20 MIN: CPT | Mod: PBBFAC,TH,PO | Performed by: OBSTETRICS & GYNECOLOGY

## 2024-10-30 ENCOUNTER — PATIENT MESSAGE (OUTPATIENT)
Dept: LACTATION | Facility: HOSPITAL | Age: 29
End: 2024-10-30
Payer: MEDICAID

## 2024-12-03 ENCOUNTER — POSTPARTUM VISIT (OUTPATIENT)
Dept: OBSTETRICS AND GYNECOLOGY | Facility: CLINIC | Age: 29
End: 2024-12-03
Payer: MEDICAID

## 2024-12-03 VITALS
DIASTOLIC BLOOD PRESSURE: 88 MMHG | HEART RATE: 98 BPM | WEIGHT: 198.06 LBS | SYSTOLIC BLOOD PRESSURE: 126 MMHG | BODY MASS INDEX: 36.23 KG/M2

## 2024-12-03 DIAGNOSIS — N81.4 CYSTOCELE WITH PROLAPSE: ICD-10-CM

## 2024-12-03 DIAGNOSIS — R10.2 ACUTE PELVIC PAIN: ICD-10-CM

## 2024-12-03 PROCEDURE — 99999 PR PBB SHADOW E&M-EST. PATIENT-LVL III: CPT | Mod: PBBFAC,,, | Performed by: OBSTETRICS & GYNECOLOGY

## 2024-12-03 PROCEDURE — 0352U VAGINOSIS SCREEN BY DNA PROBE: CPT | Performed by: OBSTETRICS & GYNECOLOGY

## 2024-12-03 PROCEDURE — 99213 OFFICE O/P EST LOW 20 MIN: CPT | Mod: PBBFAC,TH,PO | Performed by: OBSTETRICS & GYNECOLOGY

## 2024-12-03 PROCEDURE — 0503F POSTPARTUM CARE VISIT: CPT | Mod: ,,, | Performed by: OBSTETRICS & GYNECOLOGY

## 2024-12-03 RX ORDER — NORETHINDRONE 0.35 MG/1
1 TABLET ORAL DAILY
Qty: 84 TABLET | Refills: 3 | Status: SHIPPED | OUTPATIENT
Start: 2024-12-03 | End: 2025-12-03

## 2024-12-03 RX ORDER — CLINDAMYCIN PHOSPHATE 10 UG/ML
LOTION TOPICAL DAILY
Qty: 60 ML | Refills: 1 | Status: SHIPPED | OUTPATIENT
Start: 2024-12-03

## 2024-12-03 NOTE — PROGRESS NOTES
CC: follow up delivery    HPI:   Swetha Lomeli is a 29 y.o. here for f/u vaginal delivery complicated by retained placenta and need for D&C immediately after delivery. She reports normal bowel movements and urination. Pain is controlled with OTC medications. She is  now exclusively pumping .  Denies depression. Reports bleeding stopped. Is having some pressure pain in her vagina especially when having a BM.     Vitals:    24 1035   BP: 126/88   Pulse: 98       PHYSICAL EXAM:   APPEARANCE: Well nourished, well developed, in no acute distress.  ABDOMEN: Soft. No tenderness or masses. No hernias.   PELVIC:  normal external genitalia   Thin white discharge, cervix normal   Grade 1 cystocele, no rectocele         PPH   D&C   HTN     PLAN:   1. OK to slowly increase normal activities.   2. Will start acne wash and start clindamycin wash. F/u with derm if not improved    3. BP well controlled on labetalol, will decrease to 200mg BID and send me a log after a week.   4. Mild cystocele, will start pelvic floor PT    5. Discussed birth control and will do micronor.

## 2024-12-06 LAB
BACTERIAL VAGINOSIS DNA: NOT DETECTED
CANDIDA GLABRATA/KRUSEI: NOT DETECTED
CANDIDA RRNA VAG QL PROBE: NOT DETECTED
TRICHOMONAS VAGINALIS: NOT DETECTED

## 2024-12-09 ENCOUNTER — PATIENT MESSAGE (OUTPATIENT)
Dept: OBSTETRICS AND GYNECOLOGY | Facility: CLINIC | Age: 29
End: 2024-12-09
Payer: MEDICAID

## 2024-12-12 ENCOUNTER — TELEPHONE (OUTPATIENT)
Dept: OBSTETRICS AND GYNECOLOGY | Facility: CLINIC | Age: 29
End: 2024-12-12
Payer: MEDICAID

## 2024-12-12 NOTE — TELEPHONE ENCOUNTER
Contacted pt  to let her know the physical therapy department received her order and to reach out to them to get her scheduled  Gave pt the phone number, pt verbally confirmed and understood to reach out to them to get scheduled.no further questions

## 2024-12-12 NOTE — TELEPHONE ENCOUNTER
----- Message from Radha sent at 12/12/2024  2:16 PM CST -----  Regarding: Unable to Reach Patient to Schedule Pelvic Health Therapy  Good afternoon,    The Physical/Occupational Therapy department received your order to get the attached patient scheduled for an evaluation. We have reached out to the patient to schedule their evaluation; however, we have been unsuccessful in reaching them.      We wanted you to be aware that your patient has not started therapy. If you speak with them again about starting therapy please have them reach out to us at [phone] to get scheduled.     Sincerely,    Radha Reis

## 2024-12-20 ENCOUNTER — CLINICAL SUPPORT (OUTPATIENT)
Dept: REHABILITATION | Facility: HOSPITAL | Age: 29
End: 2024-12-20
Attending: OBSTETRICS & GYNECOLOGY
Payer: MEDICAID

## 2024-12-20 DIAGNOSIS — N81.4 CYSTOCELE WITH PROLAPSE: ICD-10-CM

## 2024-12-20 DIAGNOSIS — R53.1 WEAKNESS: ICD-10-CM

## 2024-12-20 DIAGNOSIS — M62.89 PELVIC FLOOR DYSFUNCTION: Primary | ICD-10-CM

## 2024-12-20 DIAGNOSIS — R10.2 ACUTE PELVIC PAIN: ICD-10-CM

## 2024-12-20 PROCEDURE — 97161 PT EVAL LOW COMPLEX 20 MIN: CPT

## 2024-12-20 NOTE — PLAN OF CARE
[facility]   Pelvic Health Physical Therapy Initial Evaluation      Date: 2024   Name: Swetha Lomeli  Clinic Number: [id number]    Therapy Diagnosis:   Encounter Diagnoses   Name Primary?    Cystocele with prolapse     Acute pelvic pain      (spontaneous vaginal delivery)     Pelvic floor dysfunction Yes    Weakness      Referring Provider: Fariba James MD    Referring Provider Orders: PT Eval and Treat  Medical Diagnosis from Referral: Cystocele with prolapse [N81.4], Acute pelvic pain [R10.2],  (spontaneous vaginal delivery) [O80   Evaluation Date: 2024  Authorization Period Expiration: 2024  Plan of Care Expiration: 3/14/2025  Progress Note Due: 2024  Visit # / Visits authorized:    FOTO: 1/3    Precautions: Standard     Time In: 9:37  Time Out: 9:17  Total Appointment Time (timed & untimed codes): 40 minutes    SUBJECTIVE     Date of onset: 2 months ago  History of current condition: Swetha reports Having a baby vaginally ~2months ago followed by emergency Dilation and Curettage. She reports that her issues tend to be worse on the left and that her left side was more numb than the right leg during delivering and she did have an epidural. Notices that she has some pressure with certain seated positions, bowel movement and and passing gas that feels like a really bad soreness.    Reports that baby girl was born a little early took some time to get into latch position so breast feeding has been new for her but she does have some back pain from this that she's noticed.    PMH: Grade 1 Cystocyle      OB/GYN History:    vaginal delivery no tearing  Hysterectomy? No  Oophorectomy? No  Using vaginal estrogen cream: No  Breast Feeding: Yes  Sexually active? Yes  Menstruating? A little bit of discharge    Birth Control? Yes     SEXUAL/PELVIC PAIN History  Pelvic Pain with: sexual activity bowel movement, sometimes when getting up  Pelvic Pain  Duration Occurs only during provocation  Location of Pelvic Pain: Deep pelvic floor muscles  notices certain positioning that creates more pressure  Description: soreness  Pain with wearing certain clothes, sitting on certain surfaces? Yes with something like an easy chair with her pelvic tipped backwards  Tailbone injury? No   Pelvic pressure? Yes  Feeling of pelvic heaviness? No    PAIN:  Location: low back pain A little bit of upper back pain  Aggravating Factors/Activities that cause symptoms:  breast feeding    Easing Factors:  avoiding any straining        BLADDER History  Bladder leakage: Yes  Activities that cause leakage: Sneezing, Coughing activities that increase pressure    BOWEL History  Frequency of bowel movements: 2 times a day  Difficulty initiating BM: Yes  Issues with constipation  Does Patient Feel Empty after BM? depends  Fiber supplements, probiotics or laxative Use? No   Pain with BM:    History of hemorrhoids/anal fissures?  No  Bleeding with BM: No     Habitus: well developed, well nourished    Prior Therapy/Previous treatment included: no prior pelvic floor therapy   Social History/Living Arrangements: lives with their family  Current exercise: Reports that she got was weightlifting before and during the pregnancy.   Prior Level of Function: no pelvic floor dysfunction  Current Level of Function: pelvic floor dysfunction    Patients goals: Improve function, reduce pain, and get back to working out.    Imaging: None reported for this condition     Medical History: Swetha  has a past medical history of Anxiety, Asthma, Migraine, and Seizures.     Surgical History: Swetha Lomeli  has a past surgical history that includes Dilation and curettage of uterus (Bilateral, 10/22/2024).    Medications: Swetha has a current medication list which includes the following prescription(s): clindamycin, ferrous sulfate, fluticasone propionate, ibuprofen, labetalol, labetalol, and  norethindrone.    Allergies: Review of patient's allergies indicates:  No Known Allergies     OBJECTIVE     See Electronic Medical Record under MEDIA for written consent provided 12/20/2024  Chaperone: declined    Pelvic Floor Contraction:  Able to perform but patient initially bulging instead of anders. Benefited from tactile and verbal cues to improve performance. Patient having issues with managing pressure and proper breathing technique    VAGINAL PELVIC FLOOR EXAM: Deferred due to time constraints      Limitation/Restriction for FOTO Pelvic Floor Surveys    Therapist reviewed FOTO scores for Swetha Lomeli on 12/20/2024  FOTO documents entered into [employer] - see Media section.    Limitation Score:   % impairment     TREATMENT     Total Treatment time (time-based codes) separate from the evaluation: 10 minutes     Therapeutic Activity Patient participated in dynamic functional therapeutic activities to improve functional performance for 102 minutes. Including: Education as described below.   [x] Education on pelvic floor anatomy, function, and assessment and impact on current level of function   [x] Patient prognosis and Physical therapy plan of care  [x] HEP building/HEP review  [x] Body mechanics for breastfeeding to reduce pain    PATIENT EDUCATION AND HOME EXERCISES     Education provided: general anatomy/physiology of urinary & bowel system, benefits of treatment, and alternative methods of treatment were discussed with the patient. Additionally, Swetha was provided education on pt prognosis and physical therapy plan of care.     Written Home Exercises provided: yes  Exercises were reviewed, and Swetha was able to demonstrate them prior to the end of the session. Swetha demonstrated good understanding of the education provided. See EMR under 'Patient Instructions' for exercises and education provided during session.    Swetha demonstrated good  understanding of the education & exercises  provided.    ASSESSMENT     Swetha is a 29 y.o. female referred to outpatient physical therapy with a medical diagnosis of Cystocele with prolapse [N81.4], Acute pelvic pain [R10.2],  (spontaneous vaginal delivery) [O80],   and additional complaints of back & pelvic pain with sexual activity, bowel movements, seated postures and transitional movements. Pt presents with stress urinary incontinence. Patient was previously working out but has not been able to return to lifting or other activities due to concern with symptoms and pain. Symptoms impair the patient's ability to perform ADLs and functional mobility, as well as remain continent. Performed quick assessment of patient's pelvic floor muscles with patient bearing down initially when attempting to contract, but this improved with practice and cues. Provided patient with education for positional considerations for breastfeeding to reduce back pain and strain.  No direct pelvic floor examination performed today, but pt presentation and subjective report suggest pelvic floor dysfunction.  Pt will benefit from pelvic floor muscle training, bladder habit retraining, bowel habit retraining, constipation management, core/hip strengthening, patient education, dilator training, manual therapy interventions, and functional retraining    Pt prognosis is Good.   Swetha will benefit from skilled outpatient physical therapy to address the deficits stated above and in the chart below, provide patient/family education, and to maximize the patient's level of independence.     Plan of care discussed with patient: yes  Patient's spiritual, cultural and educational needs considered, and the patient is agreeable to the plan of care and goals as stated below:     Anticipated Barriers for therapy: none    Medical Necessity is demonstrated by the following -  History  Co-morbidities and personal factors that may impact the plan of care [x] LOW: no personal factors /  "co-morbidities  [] MODERATE: 1-2 personal factors / co-morbidities  [] HIGH: 3+ personal factors / co-morbidities    Moderate / High Support Documentation:   Co-morbidities affecting plan of care: none    Personal Factors:   no deficits     Examination  Body Structures and Functions, activity limitations and participation restrictions that may impact the plan of care [x] LOW: addressing 1-2 elements  [] MODERATE: 3+ elements  [] HIGH: 4+ elements (please support below)    Moderate / High Support Documentation: none     Clinical Presentation [x] LOW: stable  [] MODERATE: Evolving  [] HIGH: Unstable     Decision Making/ Complexity Score: low     Goals:  Short Term Goals: 4 weeks   Pt to perform "the knack" prior to coughing, laughing or sneezing to decrease risk of incontinence.  Pt to demonstrate being able to correctly and consistently perform a kegel which is needed  to increase pelvic floor muscle coordination and strength needed for continence.  Pt to report >50% improvement in tenderness to palpation of the pelvic floor to indicate reduced myofascial dysfunction   Pt will report minimal to no pain with single digit pelvic assessment and display relaxation demonstrating improving pelvic floor muscle relaxation and coordination.   Patient to be independent with introductory home exercise program.     Long Term Goals: 12 weeks ,   Pt to be able to perform a 10 second kegel x 10 reps to demonstrate improving strength and endurance needed for continence.  Pt to demonstrate minimal/no increased tone of the pelvic floor to demonstrate improved coordination and for improved pelvic floor function and Pt to report >90% improvement in discomfort with vaginal penetration to increase tolerance for intercourse and gynecological exams, as well as indicate reduced myofascial dysfunction   dent with advanced home exercise program for carryover post discharge.    PLAN   Plan of Care certification: 12/20/2024 to  " 3/14/2025    Outpatient Physical Therapy 1-2 times weekly for 12 weeks to include the following interventions: therapeutic exercises, therapeutic activity, neuromuscular re-education, gait training, manual therapy, modalities PRN, patient/family education, and self care/home management, and dry needling.     Carmela Swenson, PT, DPT

## 2024-12-20 NOTE — PATIENT INSTRUCTIONS
""Squeeze Before you Sneeze!"    Perform a kegel before coughing, laughing, sneezing, lifting, bending, squatting, pushing, pulling, getting out of bed, standing, etc.  Try not to hold your breath!       How do I breastfeed lying down?  One of the best parts of side lying breastfeeding is having the opportunity to rest your body while feeding your baby. Allow us to walk you through some simple steps to make it a comfy experience for both you and your baby:  Place your baby on their back in the middle of the floor or on a large bed. If youre on a bed, remember to keep loose sheets, blankets, and pillows away from babys face to minimize risk.  Lie down next to your baby, with your stomach lined up near babys head. You can use a pillow under your head, just make sure that its in a place that your little one cant reach it! (You can also use a pillow as back support or between your legs if that will make you more comfortable lying on your side.)  Slide your little one up so their nose is level with your nipple and your arm is above their head. Or cradle baby with their back along your forearm. (But dont rest babys head on your upper arm.)  Roll your baby onto their side pulling their hips or knees close to your hips. (Your spine and your babys spine may form a V shape.) You can put a rolled blanket or pillow behind babys back to support them and prevent them from rolling away from you. Encourage babys nose to make contact with your nipple, but do not squash their face into your breast!  Try to position baby so their ear, shoulder, and hip are in one line. This will help them get milk more easily.  If needed, use the arm not resting on the bed to shape your breast and guide it into your babys mouth. However, many babies (especially older babies) will naturally latch on their own.      When breastfeeding seated try to bring baby to you and avoid rounding, and try to look up and away every few minutes to help " with back discomfort

## 2024-12-26 ENCOUNTER — CLINICAL SUPPORT (OUTPATIENT)
Dept: REHABILITATION | Facility: HOSPITAL | Age: 29
End: 2024-12-26
Payer: MEDICAID

## 2024-12-26 DIAGNOSIS — M62.89 PELVIC FLOOR DYSFUNCTION: Primary | ICD-10-CM

## 2024-12-26 PROCEDURE — 97110 THERAPEUTIC EXERCISES: CPT

## 2024-12-26 NOTE — PATIENT INSTRUCTIONS
Use pillows instead of blocks    For Squeeze with sneeze type activities thing about pulling pelvic floor muscles up and in.

## 2024-12-26 NOTE — PROGRESS NOTES
[facility]   Physical Therapy Treatment Note      Name: Swetha Lomeli  Clinic Number: [id number]    Therapy Diagnosis:   Encounter Diagnosis   Name Primary?    Pelvic floor dysfunction Yes     Physician: Fariba James MD    Visit Date: 2024    Referring Provider Orders: PT Eval and Treat  Medical Diagnosis from Referral: Cystocele with prolapse [N81.4], Acute pelvic pain [R10.2],  (spontaneous vaginal delivery) [O80   Evaluation Date: 2024  Authorization Period Expiration: 2024  Plan of Care Expiration: 3/14/2025  Progress Note Due: 2024  Visit # / Visits authorized:    FOTO: 1/3     Precautions: Standard    Time In: 8:07  Time Out: 9:02  Total Billable Time: 55 minutes    Subjective     Pt reports: She notices the pain often when she is seated in a cross legged position for an extended period. She started to incorporate the knack, and double void techniques with some success.    She was compliant with home exercise program.  Response to previous treatment:Antelmoood  Functional change:  some leakage improvement    Pain: internal pain we certain positions    Objective      VAGINAL PELVIC FLOOR EXAM  Written consent obtained: 2024   for assessment  Exam updated: 2024    External Assessment  Introitus: WNL  Skin condition: WNL  Scarring: none   Sensation: WNL   Pain: none  Voluntary contraction: visible drop  Voluntary relaxation: visible drop  Bearing down: visible drop     Internal Assessment  Pain: tender areas noted as follows: bilateral superficial transverse perineal, levator ani, OI   Pain with sweeping position layer 1 indicative of tissue irritation  Sensation: able to localized pressure appropriately   Vaginal vault: roomy   Muscle Bulk: hypertonus   Muscle Power: 2/5  Muscle Endurance: 4 seconds  # Reps To Fatigue: 5      Quality of contraction: decreased hold   Specificity: patient attempts to bear down instead of contract  initially   Coordination: tends to hold breath during PFM contration and cannot isolate PFM from abdominals   Prolapse check: mild tissue descent observed anteriorly  Performance improves with cues for improved pressure management.    Treatment     Swetha received the treatments listed below:    Pt verbally consents to intravaginal treatment today.  Signed consent form already on file.  Chaperone declined today.    Therapeutic exercises to develop strength, endurance, ROM, flexibility, posture, and core stabilization for 8 minutes including:   [x]  Child's pose    Manual Therapy to develop flexibility, extensibility, and desensitization for 10 minutes including:   [x] soft tissue mobilization of Superficial Transverse Perineal    Neuromuscular Re-education to develop Coordination, Control, Down training, Posture, Proprioception, Kinesthetic, and Sense for 28 minutes including:   [x]diaphragmatic breathing with Kegel   [x] Kegel education and practice. Increased time spent on edu on proper technique. Pt improves with practice and verbal cues.  [x] Education on visual cues/mental imagery for pelvic floor relaxation  [x] Education on visual cues/mental imagery for pelvic floor activation    Therapeutic Activity to improve functional performance for 9  minutes, including: Education as described below.   [x] Plan of care review  [x] Anatomy review and discussion of the anatomy on current level of function   [x] Home exercise program issued and reviewed   [x] consequences of elevated pelvic floor muscle tension, relationship between pelvic floor dysfunction & lumbar spine/hip/pelvic girdle dysfunction, the knack for management of stress urinary incontinence, and hip-opening stretches for pelvic floor relaxation    Home Exercises Provided and Patient Education Provided     Education provided: See above  Discussed progression of plan of care with patient; educated pt in activity modification; reviewed HEP with pt. Pt  demonstrated and verbalized understanding of all instruction and was provided with a handout of HEP (see Patient Instructions).    Written Home Exercises Provided: yes.  Exercises were reviewed and Swetha was able to demonstrate them prior to the end of the session.  Swetha demonstrated good  understanding of the education provided.     See EMR under Patient Instructions for exercises provided 12/26/2024     Assessment     Todays session included initial pelvic floor muscles assessment with patient presenting with some pain and tissue discomfort near superficial transverse perineal, as well as muscle tension and slight discomfort at bilateral levator ani, and obturator internus. Patient tends to perform perodixical movement of pelvic floor muscles bearing down instead of squeeze, when performing without prompting however, patient able to contract pelvic floor muscles 2/5 with verbal cues and practice. Patient has limited pelvic floor muscles endurance with 4 second hold. Patient reports interest in return to workout activity postpartum so will further assess patient's strengths and deficits next session to advise on safe return. Pt will continue to benefit from skilled outpatient physical therapy to address the deficits listed in the problem list box on initial evaluation, provide pt/family education and to maximize pt's level of independence in the home and community environment.     Swetha Is progressing well towards her goals.   Pt prognosis is Good.     Pt will continue to benefit from skilled outpatient physical therapy to address the deficits listed in the problem list box on initial evaluation, provide pt/family education and to maximize pt's level of independence in the home and community environment.     Pt's spiritual, cultural and educational needs considered and pt agreeable to plan of care and goals.     Anticipated barriers to physical therapy: none    Goals:   Goals:  Short Term Goals: 4 weeks   Pt to  "perform "the knack" prior to coughing, laughing or sneezing to decrease risk of incontinence.  Pt to demonstrate being able to correctly and consistently perform a kegel which is needed  to increase pelvic floor muscle coordination and strength needed for continence.  Pt to report >50% improvement in tenderness to palpation of the pelvic floor to indicate reduced myofascial dysfunction   Pt will report minimal to no pain with single digit pelvic assessment and display relaxation demonstrating improving pelvic floor muscle relaxation and coordination.   Patient to be independent with introductory home exercise program.     Long Term Goals: 12 weeks ,   Pt to be able to perform a 10 second kegel x 10 reps to demonstrate improving strength and endurance needed for continence.  Pt to demonstrate minimal/no increased tone of the pelvic floor to demonstrate improved coordination and for improved pelvic floor function and Pt to report >90% improvement in discomfort with vaginal penetration to increase tolerance for intercourse and gynecological exams, as well as indicate reduced myofascial dysfunction   dent with advanced home exercise program for carryover post discharge.    Plan     Continue with established Plan of Care, working toward established PT goals.    At next visit: assess patient's Strength Range of Motion and balance as it relates to her plan of care.    Carmela Swenson, PT, DPT    "

## 2024-12-30 ENCOUNTER — CLINICAL SUPPORT (OUTPATIENT)
Dept: REHABILITATION | Facility: HOSPITAL | Age: 29
End: 2024-12-30
Attending: OBSTETRICS & GYNECOLOGY
Payer: MEDICAID

## 2024-12-30 DIAGNOSIS — M62.89 PELVIC FLOOR DYSFUNCTION: Primary | ICD-10-CM

## 2024-12-30 PROCEDURE — 97530 THERAPEUTIC ACTIVITIES: CPT

## 2024-12-30 PROCEDURE — 97110 THERAPEUTIC EXERCISES: CPT

## 2024-12-30 PROCEDURE — 97112 NEUROMUSCULAR REEDUCATION: CPT

## 2024-12-30 PROCEDURE — 97140 MANUAL THERAPY 1/> REGIONS: CPT

## 2024-12-30 NOTE — PATIENT INSTRUCTIONS
Home Exercise Program: 12/30/2024    Hip and Pelvic Stretching Program                         \

## 2024-12-30 NOTE — PROGRESS NOTES
[facility]   Physical Therapy Treatment Note      Name: Swetha Lomeil  Clinic Number: [id number]    Therapy Diagnosis:   Encounter Diagnosis   Name Primary?    Pelvic floor dysfunction Yes     Physician: Fariba James MD    Visit Date: 2024    Referring Provider Orders: PT Eval and Treat  Medical Diagnosis from Referral: Cystocele with prolapse [N81.4], Acute pelvic pain [R10.2],  (spontaneous vaginal delivery) [O80   Evaluation Date: 2024  Authorization Period Expiration: 2024  Plan of Care Expiration: 3/14/2025  Progress Note Due: 2024  Visit # / Visits authorized: 3/20   FOTO: 1/3     Precautions: Standard, history of pre-eclampsia     Time In: 1015  Time Out: 1100  Total Billable Time: 45 minutes    Subjective     Pt reports: She delivered her baby about 9 months ago. No tearing with her vaginal delivery. She had a DNC after though in order to remove the placenta.   She continues to have perineal soreness with bowel movements, straining to urinate, sitting with legs crossed.  She only strains to urinate when she feels she can't empty her bladder.    She feels like the knack is helping.  Less leakage reported with sneezing and coughing.        She was compliant with home exercise program.  Response to previous treatment:Antelmoood  Functional change:  some leakage improvement    Pain: internal pain we certain positions    Objective      VAGINAL PELVIC FLOOR EXAM  Written consent obtained: 2024   for assessment  Exam updated: 2024    External Assessment  Introitus: WNL  Skin condition: WNL  Scarring: none   Sensation: WNL   Pain: none  Voluntary contraction: visible drop  Voluntary relaxation: visible drop  Bearing down: visible drop     Internal Assessment  Pain: tender areas noted as follows: bilateral superficial transverse perineal, levator ani,   Pain with sweeping position layer 1 indicative of tissue irritation  Sensation: able to  localized pressure appropriately   Vaginal vault: roomy   Muscle Bulk: hypertonus but decreases with cues to relax muscle(s)   Muscle Power: 2/5  Muscle Endurance: 4 seconds  # Reps To Fatigue: 5      Quality of contraction: decreased hold   Specificity: patient attempts to bear down instead of contract initially   Coordination: tends to hold breath during PFM contration and cannot isolate PFM from abdominals   Prolapse check: mild tissue descent observed anteriorly  Performance improves with cues for improved pressure management.    Treatment     Swetha received the treatments listed below:    Pt verbally consents to intravaginal treatment today.  Signed consent form already on file.  Chaperone declined today.    Therapeutic exercises to develop strength, endurance, ROM, flexibility, posture, and core stabilization for 8 minutes including:   [x]  Child's pose  3x30 sec  [x]  Happy baby 3x 30 sec  [x]  Cat camel 10 sec x 10 reps each     Manual Therapy to develop flexibility, extensibility, and desensitization for 10 minutes including:   [x] soft tissue mobilization of Superficial Transverse Perineal      Neuromuscular Re-education to develop Coordination, Control, Down training, Posture, Proprioception, Kinesthetic, and Sense for 19 minutes including:   [x]pelvic floor relaxation speed after performing a kegel and diaphragmatic breathing with Kegel   [x] Kegel education and practice. Increased time spent on edu on proper technique. Pt improves with practice and verbal cues.  [x] Education on visual cues/mental imagery for pelvic floor relaxation  [x] Education on visual cues/mental imagery for pelvic floor activation    Therapeutic Activity to improve functional performance for 8  minutes, including: Education as described below.   [x] Plan of care review  [x] Anatomy review and discussion of the anatomy on current level of function   [x] Home exercise program issued and reviewed   [x] consequences of elevated  pelvic floor muscle tension, relationship between pelvic floor dysfunction & lumbar spine/hip/pelvic girdle dysfunction, the knack for management of stress urinary incontinence, and hip-opening stretches for pelvic floor relaxation    Home Exercises Provided and Patient Education Provided     Education provided: See above  Discussed progression of plan of care with patient; educated pt in activity modification; reviewed HEP with pt. Pt demonstrated and verbalized understanding of all instruction and was provided with a handout of HEP (see Patient Instructions).    Written Home Exercises Provided: yes.  Exercises were reviewed and Swetha was able to demonstrate them prior to the end of the session.  Swetha demonstrated good  understanding of the education provided.     See EMR under Patient Instructions for exercises provided 12/30/2024     Assessment     Swetha consents to an intravaginal pelvic floor muscle(s) assessment and treatment today.  She is presenting with some pain and tissue discomfort near superficial transverse perineal, as well as muscle tension and slight discomfort at bilateral levator ani. Did not notice a paradoxical movement of the pelvic floor muscle(s) today compared to the last visit.  Initiated hip and pelvic floor muscle(s) opening stretches today.  Patient reports interest in return to workout activity postpartum so will further assess patient's strengths and deficits next session to advise on safe return. Pt will continue to benefit from skilled outpatient physical therapy to address the deficits listed in the problem list box on initial evaluation, provide pt/family education and to maximize pt's level of independence in the home and community environment.     Swetha Is progressing well towards her goals.   Pt prognosis is Good.     Pt will continue to benefit from skilled outpatient physical therapy to address the deficits listed in the problem list box on initial evaluation, provide  "pt/family education and to maximize pt's level of independence in the home and community environment.     Pt's spiritual, cultural and educational needs considered and pt agreeable to plan of care and goals.     Anticipated barriers to physical therapy: none    Goals:  Short Term Goals: 4 weeks   Pt to perform "the knack" prior to coughing, laughing or sneezing to decrease risk of incontinence.  Pt to demonstrate being able to correctly and consistently perform a kegel which is needed  to increase pelvic floor muscle coordination and strength needed for continence.  Pt to report >50% improvement in tenderness to palpation of the pelvic floor to indicate reduced myofascial dysfunction   Pt will report minimal to no pain with single digit pelvic assessment and display relaxation demonstrating improving pelvic floor muscle relaxation and coordination.   Patient to be independent with introductory home exercise program.     Long Term Goals: 12 weeks ,   Pt to be able to perform a 10 second kegel x 10 reps to demonstrate improving strength and endurance needed for continence.  Pt to demonstrate minimal/no increased tone of the pelvic floor to demonstrate improved coordination and for improved pelvic floor function and Pt to report >90% improvement in discomfort with vaginal penetration to increase tolerance for intercourse and gynecological exams, as well as indicate reduced myofascial dysfunction   dent with advanced home exercise program for carryover post discharge.    Plan     Continue with established Plan of Care, working toward established PT goals.    At next visit: assess patient's Strength Range of Motion and balance as it relates to her plan of care.    Kathy Farnsworth, PT, DPT      "

## 2025-01-07 ENCOUNTER — CLINICAL SUPPORT (OUTPATIENT)
Dept: REHABILITATION | Facility: HOSPITAL | Age: 30
End: 2025-01-07
Payer: MEDICAID

## 2025-01-07 DIAGNOSIS — M62.89 PELVIC FLOOR DYSFUNCTION: Primary | ICD-10-CM

## 2025-01-07 PROCEDURE — 97140 MANUAL THERAPY 1/> REGIONS: CPT

## 2025-01-07 PROCEDURE — 97112 NEUROMUSCULAR REEDUCATION: CPT

## 2025-01-07 PROCEDURE — 97530 THERAPEUTIC ACTIVITIES: CPT

## 2025-01-07 NOTE — PATIENT INSTRUCTIONS
Home Exercise Program: 01/07/2025    Hip Strengthening

## 2025-01-07 NOTE — PROGRESS NOTES
[facility]   Physical Therapy Treatment Note      Name: Swetha Lomeli  Clinic Number: [id number]    Therapy Diagnosis:   Encounter Diagnosis   Name Primary?    Pelvic floor dysfunction Yes     Physician: Fariba James MD    Visit Date: 2025    Referring Provider Orders: PT Eval and Treat  Medical Diagnosis from Referral: Cystocele with prolapse [N81.4], Acute pelvic pain [R10.2],  (spontaneous vaginal delivery) [O80   Evaluation Date: 2024  Authorization Period Expiration: 2024  Plan of Care Expiration: 3/14/2025  Progress Note Due: 2024  Visit # / Visits authorized:    FOTO: 2/3     Precautions: Standard, history of pre-eclampsia     Time In: 930  Time Out: 1015  Total Billable Time: 45 minutes    Subjective     Pt reports: Feels like her pelvic floor muscle(s) is doing much better.  Has been working on her kegels with activities of daily living and feels like she is having less leakage (unless it's a really hard sneeze).  Feels like she's getting stronger.  Also working on relaxing her pelvic floor muscle(s) more which is helping with pain when going to the bathroom.  Still having pain with insertion during intercourse.  Feels like it's easier to do a kegel in standing vs lying down.  Just stopped breast feeding 2 days ago.      Prior visit: She delivered her baby about 3 months ago. No tearing with her vaginal delivery. She had a DNC after though in order to remove the placenta.   She continues to have perineal soreness with bowel movements, straining to urinate, sitting with legs crossed.  She only strains to urinate when she feels she can't empty her bladder.    She feels like the knack is helping.  Less leakage reported with sneezing and coughing.        She was compliant with home exercise program.  Response to previous treatment:Antelmoood  Functional change:  some leakage improvement    Pain: internal pain we certain positions    Objective       VAGINAL PELVIC FLOOR EXAM  Written consent obtained: 12/26/2024   for assessment  Exam updated: 12/30/2024    External Assessment  Introitus: WNL  Skin condition: WNL  Scarring: none   Sensation: WNL   Pain: none  Voluntary contraction: visible drop  Voluntary relaxation: visible drop  Bearing down: visible drop     Internal Assessment  Pain: tender areas noted as follows: bilateral superficial transverse perineal, levator ani,   Pain with sweeping position layer 1 indicative of tissue irritation.  Skin at the introitus   Sensation: able to localized pressure appropriately   Vaginal vault: roomy   Muscle Bulk: hypertonus but decreases with cues to relax muscle(s)   Muscle Power: 2/5  Muscle Endurance: 4 seconds  # Reps To Fatigue: 5      Quality of contraction: decreased hold   Specificity: patient attempts to bear down instead of contract initially   Coordination: tends to hold breath during PFM contration and cannot isolate PFM from abdominals   Prolapse check: mild tissue descent observed anteriorly  Performance improves with cues for improved pressure management.    Treatment     Swetha received the treatments listed below:    Pt verbally consents to intravaginal treatment today.  Signed consent form already on file.  Chaperone declined today.    Therapeutic exercises to develop strength, endurance, ROM, flexibility, posture, and core stabilization for 00 minutes including:   []  Child's pose  3x30 sec  []  Happy baby 3x 30 sec  []  Cat camel 10 sec x 10 reps each     Manual Therapy to develop flexibility, extensibility, and desensitization for 23 minutes including:   [x] soft tissue mobilization of Superficial Transverse Perineal  [x] Myofascial release of the levator ani muscle(s) internally bilaterally       Neuromuscular Re-education to develop Coordination, Control, Down training, Posture, Proprioception, Kinesthetic, and Sense for 14 minutes including:   []pelvic floor relaxation speed after performing  a kegel and diaphragmatic breathing with Kegel   [] Kegel education and practice. Increased time spent on edu on proper technique. Pt improves with practice and verbal cues.  [x] Education on visual cues/mental imagery for pelvic floor relaxation.  Pelvic floor muscle(s) relaxation and lengthening   [] Education on visual cues/mental imagery for pelvic floor activation  [x]  Posterior pelvic tilt 5 sec x 10 reps x 2 sets     Therapeutic Activity to improve functional performance for 8  minutes, including: Education as described below.   [x] Plan of care review  [x] Anatomy review and discussion of the anatomy on current level of function   [x] Home exercise program reviewed   [] consequences of elevated pelvic floor muscle tension, relationship between pelvic floor dysfunction & lumbar spine/hip/pelvic girdle dysfunction, the knack for management of stress urinary incontinence, and hip-opening stretches for pelvic floor relaxation  [x] Edu on hormone impact on vaginal tissue quality.  Discussed skin irritants that might also be contributing to her introital tissue irritation.      Home Exercises Provided and Patient Education Provided     Education provided: See above  Discussed progression of plan of care with patient; educated pt in activity modification; reviewed HEP with pt. Pt demonstrated and verbalized understanding of all instruction and was provided with a handout of HEP (see Patient Instructions).    Written Home Exercises Provided: yes.  Exercises were reviewed and Swetha was able to demonstrate them prior to the end of the session.  Swetha demonstrated good  understanding of the education provided.     See EMR under Patient Instructions for exercises provided 1/7/2025     Assessment      Swetha consents to an intravaginal pelvic floor muscle(s) assessment and treatment today.  She is noted to have redness at the introitus and reports a raw skin feeling.  Edu provided on hormonal changes that might affect  "tissue as well as external skin irritants.  Worked on addressing myofascial restrictions in the deeper pelvic floor muscle(s) layers today as well as downtrianing.  Initiated gentle strengthening program today. Patient reports interest in return to workout activity postpartum so will further assess patient's strengths and deficits next session to advise on safe return. Pt will continue to benefit from skilled outpatient physical therapy to address the deficits listed in the problem list box on initial evaluation, provide pt/family education and to maximize pt's level of independence in the home and community environment.     Swetha Is progressing well towards her goals.   Pt prognosis is Good.     Pt will continue to benefit from skilled outpatient physical therapy to address the deficits listed in the problem list box on initial evaluation, provide pt/family education and to maximize pt's level of independence in the home and community environment.     Pt's spiritual, cultural and educational needs considered and pt agreeable to plan of care and goals.     Anticipated barriers to physical therapy: none    Goals:  Short Term Goals: 4 weeks   Pt to perform "the knack" prior to coughing, laughing or sneezing to decrease risk of incontinence.  Pt to demonstrate being able to correctly and consistently perform a kegel which is needed  to increase pelvic floor muscle coordination and strength needed for continence.  Pt to report >50% improvement in tenderness to palpation of the pelvic floor to indicate reduced myofascial dysfunction   Pt will report minimal to no pain with single digit pelvic assessment and display relaxation demonstrating improving pelvic floor muscle relaxation and coordination.   Patient to be independent with introductory home exercise program.     Long Term Goals: 12 weeks ,   Pt to be able to perform a 10 second kegel x 10 reps to demonstrate improving strength and endurance needed for " continence.  Pt to demonstrate minimal/no increased tone of the pelvic floor to demonstrate improved coordination and for improved pelvic floor function and Pt to report >90% improvement in discomfort with vaginal penetration to increase tolerance for intercourse and gynecological exams, as well as indicate reduced myofascial dysfunction   dent with advanced home exercise program for carryover post discharge.    Plan     Continue with established Plan of Care, working toward established PT goals.    At next visit: assess patient's Strength Range of Motion and balance as it relates to her plan of care.    Kathy Farnsworth, PT, DPT

## 2025-01-13 ENCOUNTER — CLINICAL SUPPORT (OUTPATIENT)
Dept: REHABILITATION | Facility: HOSPITAL | Age: 30
End: 2025-01-13
Payer: MEDICAID

## 2025-01-13 DIAGNOSIS — M62.89 PELVIC FLOOR DYSFUNCTION: Primary | ICD-10-CM

## 2025-01-13 PROCEDURE — 97112 NEUROMUSCULAR REEDUCATION: CPT

## 2025-01-13 NOTE — PROGRESS NOTES
[facility]   Physical Therapy Treatment Note      Name: Swetha Lomeli  Clinic Number: [id number]    Therapy Diagnosis:   Encounter Diagnosis   Name Primary?    Pelvic floor dysfunction Yes     Physician: Fariba James MD    Visit Date: 2025    Referring Provider Orders: PT Eval and Treat  Medical Diagnosis from Referral: Cystocele with prolapse [N81.4], Acute pelvic pain [R10.2],  (spontaneous vaginal delivery) [O80   Evaluation Date: 2024  Authorization Period Expiration: 2024  Plan of Care Expiration: 3/14/2025  Progress Note Due: 2024  Visit # / Visits authorized:    FOTO: 2/3     Precautions: Standard, history of pre-eclampsia     Time In: 1247  Time Out: 1330  Total Billable Time:  43 minutes    Subjective     Pt reports: She feels like she has made 70% progress with reduction of her pain.  She reports less pain with intimacy and other activities of daily living.  Squatting for more than a minute increases her pain. Significant decrease in urinary incontinence with activities of daily living unless it's a big sneeze or cough.      Just stopped breast feeding over a week ago.      She was compliant with home exercise program.  Response to previous treatment:Inga  Functional change:  some leakage improvement    Pain: internal pain we certain positions    Objective      VAGINAL PELVIC FLOOR EXAM  Written consent obtained: 2024   for assessment  Exam updated: 2024    External Assessment  Introitus: WNL  Skin condition: WNL  Scarring: none   Sensation: WNL   Pain: none  Voluntary contraction: visible drop  Voluntary relaxation: visible drop  Bearing down: visible drop     Internal Assessment  Pain: tender areas noted as follows: bilateral superficial transverse perineal, levator ani,   Pain with sweeping position layer 1 indicative of tissue irritation.  Skin at the introitus   Sensation: able to localized pressure  appropriately   Vaginal vault: roomy   Muscle Bulk: hypertonus but decreases with cues to relax muscle(s)   Muscle Power: 2/5  Muscle Endurance: 4 seconds  # Reps To Fatigue: 5      Quality of contraction: decreased hold   Specificity: patient attempts to bear down instead of contract initially   Coordination: tends to hold breath during PFM contration and cannot isolate PFM from abdominals   Prolapse check: mild tissue descent observed anteriorly  Performance improves with cues for improved pressure management.    Treatment     Swetha received the treatments listed below:    Pt verbally consents to intravaginal treatment today.  Signed consent form already on file.  Chaperone declined today.    Therapeutic exercises to develop strength, endurance, ROM, flexibility, posture, and core stabilization for 00 minutes including:   []  Child's pose  3x30 sec  []  Happy baby 3x 30 sec  []  Cat camel 10 sec x 10 reps each     Manual Therapy to develop flexibility, extensibility, and desensitization for 00 minutes including:   [] soft tissue mobilization of Superficial Transverse Perineal  [] Myofascial release of the levator ani muscle(s) internally bilaterally       Neuromuscular Re-education to develop Coordination, Control, Down training, Posture, Proprioception, Kinesthetic, and Sense for 35 minutes including:   []pelvic floor relaxation speed after performing a kegel and diaphragmatic breathing with Kegel   [] Kegel education and practice. Increased time spent on edu on proper technique. Pt improves with practice and verbal cues.  [x] Education on visual cues/mental imagery for pelvic floor relaxation.  Pelvic floor muscle(s) relaxation and lengthening   [] Education on visual cues/mental imagery for pelvic floor activation  [x]  Posterior pelvic tilt 5 sec x 10 reps x 2 sets cues to focus on relaxation after each rep   [x] Posterior pelvic tilt with bridge 5 sec hold x 10 reps with cues to focus on relaxation   [x] Pt  struggles to isolate TrA initially but improves with practice. Tactile and verbal cues required for correct activation of TrA. Initially pt substitutes with rectus abdominus muscle but with mod verbal and tactile instruction is able to isolate out  lower abdominal muscles.    [x] Transverse abdominus muscle 5 sec x 10 reps  [] Transverse abdominus muscle +Kegel 5 sec x 10 reps  [x] Transverse abdominus muscle +Kegel+Marching x5 reps.   [] Transverse abdominus muscle +Kegel+bent knee fall out x10 reps.   Cues needed for correct TA activation and coordination with kegel.        Therapeutic Activity to improve functional performance for 8  minutes, including: Education as described below.   [x] Plan of care review  [x] Anatomy review and discussion of the anatomy on current level of function   [x] Home exercise program reviewed   [] consequences of elevated pelvic floor muscle tension, relationship between pelvic floor dysfunction & lumbar spine/hip/pelvic girdle dysfunction, the knack for management of stress urinary incontinence, and hip-opening stretches for pelvic floor relaxation  [] Edu on hormone impact on vaginal tissue quality.  Discussed skin irritants that might also be contributing to her introital tissue irritation.      Home Exercises Provided and Patient Education Provided     Education provided: See above  Discussed progression of plan of care with patient; educated pt in activity modification; reviewed HEP with pt. Pt demonstrated and verbalized understanding of all instruction and was provided with a handout of HEP (see Patient Instructions).    Written Home Exercises Provided: yes.  Exercises were reviewed and Swetha was able to demonstrate them prior to the end of the session.  Swetha demonstrated good  understanding of the education provided.     See EMR under Patient Instructions for exercises provided 1/13/2025     Assessment   Progressed hip and core strengthening today.  Swetha performs  "exercises slowly and methodically in order to ensure proper technique thereby requiring increased time to complete. Pt will continue to benefit from skilled outpatient physical therapy to address the deficits listed in the problem list box on initial evaluation, provide pt/family education and to maximize pt's level of independence in the home and community environment.     Swetha Is progressing well towards her goals.   Pt prognosis is Good.     Pt will continue to benefit from skilled outpatient physical therapy to address the deficits listed in the problem list box on initial evaluation, provide pt/family education and to maximize pt's level of independence in the home and community environment.     Pt's spiritual, cultural and educational needs considered and pt agreeable to plan of care and goals.     Anticipated barriers to physical therapy: none    Goals:  Short Term Goals: 4 weeks   Pt to perform "the knack" prior to coughing, laughing or sneezing to decrease risk of incontinence.  Pt to demonstrate being able to correctly and consistently perform a kegel which is needed  to increase pelvic floor muscle coordination and strength needed for continence.  Pt to report >50% improvement in tenderness to palpation of the pelvic floor to indicate reduced myofascial dysfunction   Pt will report minimal to no pain with single digit pelvic assessment and display relaxation demonstrating improving pelvic floor muscle relaxation and coordination.   Patient to be independent with introductory home exercise program.     Long Term Goals: 12 weeks ,   Pt to be able to perform a 10 second kegel x 10 reps to demonstrate improving strength and endurance needed for continence.  Pt to demonstrate minimal/no increased tone of the pelvic floor to demonstrate improved coordination and for improved pelvic floor function and Pt to report >90% improvement in discomfort with vaginal penetration to increase tolerance for intercourse and " gynecological exams, as well as indicate reduced myofascial dysfunction   dent with advanced home exercise program for carryover post discharge.    Plan     Continue with established Plan of Care, working toward established PT goals.    At next visit: assess patient's Strength Range of Motion and balance as it relates to her plan of care.    Kathy Farnsworth, PT, DPT

## 2025-01-13 NOTE — PATIENT INSTRUCTIONS
"Home Exercise Program: 01/13/2025    Hip Strengthening             Home Exercise Program: 01/13/2025    Abdominal Muscle Training:  Tighten your abdominal muscles without sucking in our pooching your belly.  Tighten and draw in your muscles.  Don't hold your breath!  It actually helps to exhale while your tighten.  "Blow out birthday candles."     Hold 5 seconds.  Repeat 10 times.  2 sets per day.                                                                     "

## 2025-01-27 ENCOUNTER — CLINICAL SUPPORT (OUTPATIENT)
Dept: REHABILITATION | Facility: HOSPITAL | Age: 30
End: 2025-01-27
Payer: MEDICAID

## 2025-01-27 DIAGNOSIS — M62.89 PELVIC FLOOR DYSFUNCTION: Primary | ICD-10-CM

## 2025-01-27 PROCEDURE — 97110 THERAPEUTIC EXERCISES: CPT

## 2025-01-27 NOTE — PROGRESS NOTES
[facility]   Physical Therapy Treatment Note      Name: Swetha Lomeli  Clinic Number: [id number]    Therapy Diagnosis:   Encounter Diagnosis   Name Primary?    Pelvic floor dysfunction Yes       Physician: Fariba James MD    Visit Date: 2025    Referring Provider Orders: PT Eval and Treat  Medical Diagnosis from Referral: Cystocele with prolapse [N81.4], Acute pelvic pain [R10.2],  (spontaneous vaginal delivery) [O80   Evaluation Date: 2024  Authorization Period Expiration: 2024  Plan of Care Expiration: 3/14/2025  Progress Note Due: 2024  Visit # / Visits authorized:    FOTO: 2/3     Precautions: Standard, history of pre-eclampsia     Time In: 854  Time Out: 932  Total Billable Time:  38 minutes    Subjective     Pt reports: Was able to move her house without leakage. No pain reported since her last visit.    Just stopped breast feeding over a week ago.      She was compliant with home exercise program.  Response to previous treatment:Goood  Functional change:  some leakage improvement    Pain: internal pain we certain positions    Objective      VAGINAL PELVIC FLOOR EXAM  Written consent obtained: 2024   for assessment  Exam updated: 2024    External Assessment  Introitus: WNL  Skin condition: WNL  Scarring: none   Sensation: WNL   Pain: none  Voluntary contraction: visible drop  Voluntary relaxation: visible drop  Bearing down: visible drop     Internal Assessment  Pain: tender areas noted as follows: bilateral superficial transverse perineal, levator ani,   Pain with sweeping position layer 1 indicative of tissue irritation.  Skin at the introitus   Sensation: able to localized pressure appropriately   Vaginal vault: roomy   Muscle Bulk: hypertonus but decreases with cues to relax muscle(s)   Muscle Power: 2/5  Muscle Endurance: 4 seconds  # Reps To Fatigue: 5      Quality of contraction: decreased hold   Specificity: patient  attempts to bear down instead of contract initially   Coordination: tends to hold breath during PFM contration and cannot isolate PFM from abdominals   Prolapse check: mild tissue descent observed anteriorly  Performance improves with cues for improved pressure management.    Treatment     Swetha received the treatments listed below:    Pt verbally consents to intravaginal treatment today.  Signed consent form already on file.  Chaperone declined today.    Therapeutic exercises to develop strength, endurance, ROM, flexibility, posture, and core stabilization for 00 minutes including:   []  Child's pose  3x30 sec  []  Happy baby 3x 30 sec  []  Cat camel 10 sec x 10 reps each     Manual Therapy to develop flexibility, extensibility, and desensitization for 00 minutes including:   [] soft tissue mobilization of Superficial Transverse Perineal  [] Myofascial release of the levator ani muscle(s) internally bilaterally       Neuromuscular Re-education to develop Coordination, Control, Down training, Posture, Proprioception, Kinesthetic, and Sense for 35 minutes including:   []pelvic floor relaxation speed after performing a kegel and diaphragmatic breathing with Kegel   [] Kegel education and practice. Increased time spent on edu on proper technique. Pt improves with practice and verbal cues.  [x] Education on visual cues/mental imagery for pelvic floor relaxation.  Pelvic floor muscle(s) relaxation and lengthening   [] Education on visual cues/mental imagery for pelvic floor activation  [x]  Posterior pelvic tilt 5 sec x 10 reps x 2 sets cues to focus on relaxation after each rep   [x] Posterior pelvic tilt with bridge 5 sec hold x 10 reps with cues to focus on relaxation   [x] Pt struggles to isolate TrA initially but improves with practice. Tactile and verbal cues required for correct activation of TrA. Initially pt substitutes with rectus abdominus muscle but with mod verbal and tactile instruction is able to isolate  out  lower abdominal muscles.    [x] Transverse abdominus muscle 5 sec x 10 reps  [] Transverse abdominus muscle +Kegel 5 sec x 10 reps  [x] Transverse abdominus muscle +Kegel+Marching 5 sec x10 reps.   [x] Transverse abdominus muscle +Kegel+bent knee fall out 5 sec x10 reps.   Cues needed for correct TA activation and coordination with kegel.        Therapeutic Activity to improve functional performance for 8  minutes, including: Education as described below.   [x] Plan of care review  [x] Anatomy review and discussion of the anatomy on current level of function   [x] Home exercise program reviewed   [] consequences of elevated pelvic floor muscle tension, relationship between pelvic floor dysfunction & lumbar spine/hip/pelvic girdle dysfunction, the knack for management of stress urinary incontinence, and hip-opening stretches for pelvic floor relaxation  [] Edu on hormone impact on vaginal tissue quality.  Discussed skin irritants that might also be contributing to her introital tissue irritation.      Home Exercises Provided and Patient Education Provided     Education provided: See above  Discussed progression of plan of care with patient; educated pt in activity modification; reviewed HEP with pt. Pt demonstrated and verbalized understanding of all instruction and was provided with a handout of HEP (see Patient Instructions).    Written Home Exercises Provided: yes.  Exercises were reviewed and Swetha was able to demonstrate them prior to the end of the session.  Swetha demonstrated good  understanding of the education provided.     See EMR under Patient Instructions for exercises provided 1/27/2025     Assessment   Progressed hip and core strengthening today.  Swetha performs exercises slowly and methodically in order to ensure proper technique thereby requiring increased time to complete. Pt will continue to benefit from skilled outpatient physical therapy to address the deficits listed in the problem list  "box on initial evaluation, provide pt/family education and to maximize pt's level of independence in the home and community environment.     Swetha Is progressing well towards her goals.   Pt prognosis is Good.     Pt will continue to benefit from skilled outpatient physical therapy to address the deficits listed in the problem list box on initial evaluation, provide pt/family education and to maximize pt's level of independence in the home and community environment.     Pt's spiritual, cultural and educational needs considered and pt agreeable to plan of care and goals.     Anticipated barriers to physical therapy: none    Goals:  Short Term Goals: 4 weeks   Pt to perform "the knack" prior to coughing, laughing or sneezing to decrease risk of incontinence.  Pt to demonstrate being able to correctly and consistently perform a kegel which is needed  to increase pelvic floor muscle coordination and strength needed for continence.  Pt to report >50% improvement in tenderness to palpation of the pelvic floor to indicate reduced myofascial dysfunction   Pt will report minimal to no pain with single digit pelvic assessment and display relaxation demonstrating improving pelvic floor muscle relaxation and coordination.   Patient to be independent with introductory home exercise program.     Long Term Goals: 12 weeks ,   Pt to be able to perform a 10 second kegel x 10 reps to demonstrate improving strength and endurance needed for continence.  Pt to demonstrate minimal/no increased tone of the pelvic floor to demonstrate improved coordination and for improved pelvic floor function and Pt to report >90% improvement in discomfort with vaginal penetration to increase tolerance for intercourse and gynecological exams, as well as indicate reduced myofascial dysfunction   dent with advanced home exercise program for carryover post discharge.    Plan     Continue with established Plan of Care, working toward established PT " goals.    At next visit: assess patient's Strength Range of Motion and balance as it relates to her plan of care.    Kathy Farnsworth, PT, DPT

## 2025-02-10 ENCOUNTER — CLINICAL SUPPORT (OUTPATIENT)
Dept: REHABILITATION | Facility: HOSPITAL | Age: 30
End: 2025-02-10
Payer: MEDICAID

## 2025-02-10 DIAGNOSIS — M62.89 PELVIC FLOOR DYSFUNCTION: Primary | ICD-10-CM

## 2025-02-10 PROCEDURE — 97112 NEUROMUSCULAR REEDUCATION: CPT

## 2025-02-10 PROCEDURE — 97530 THERAPEUTIC ACTIVITIES: CPT

## 2025-02-10 NOTE — PROGRESS NOTES
"  [facility]   Physical Therapy Treatment and Discharge Note      Name: Swetha Lomeli  Clinic Number: [id number]    Therapy Diagnosis:   Encounter Diagnosis   Name Primary?    Pelvic floor dysfunction Yes       Physician: Fariba James MD    Visit Date: 2/10/2025    Referring Provider Orders: PT Eval and Treat  Medical Diagnosis from Referral: Cystocele with prolapse [N81.4], Acute pelvic pain [R10.2],  (spontaneous vaginal delivery) [O80   Evaluation Date: 2024  Authorization Period Expiration: 2024  Plan of Care Expiration: 3/14/2025  Progress Note Due: 2024  Visit # / Visits authorized:    FOTO: 2/3     Precautions: Standard, history of pre-eclampsia     Time In: 1335  Time Out: 1416  Total Billable Time:  41 minutes    Subjective     Pt reports: "I'm doing a lot better and have not had much pain since our last visit."  She feels like she has made at least 95% progress.      She was compliant with home exercise program.  Response to previous treatment:Antelmoood  Functional change:  some leakage improvement    Pain: internal pain we certain positions    Objective      VAGINAL PELVIC FLOOR EXAM  Written consent obtained: 2024   for assessment  Exam updated: 2024    External Assessment  Introitus: WNL  Skin condition: WNL  Scarring: none   Sensation: WNL   Pain: none  Voluntary contraction: visible drop  Voluntary relaxation: visible drop  Bearing down: visible drop     Internal Assessment  Pain: tender areas noted as follows: bilateral superficial transverse perineal, levator ani,   Pain with sweeping position layer 1 indicative of tissue irritation.  Skin at the introitus   Sensation: able to localized pressure appropriately   Vaginal vault: roomy   Muscle Bulk: hypertonus but decreases with cues to relax muscle(s)   Muscle Power: 2/5  Muscle Endurance: 4 seconds  # Reps To Fatigue: 5      Quality of contraction: decreased hold   Specificity: " patient attempts to bear down instead of contract initially   Coordination: tends to hold breath during PFM contration and cannot isolate PFM from abdominals   Prolapse check: mild tissue descent observed anteriorly  Performance improves with cues for improved pressure management.    Treatment     Swetha received the treatments listed below:    Pt verbally consents to intravaginal treatment today.  Signed consent form already on file.  Chaperone declined today.          Neuromuscular Re-education to develop Coordination, Control, Down training, Posture, Proprioception, Kinesthetic, and Sense for 33 minutes including:   [x]  Posterior pelvic tilt 5 sec x 10 reps x 2 sets cues to focus on relaxation after each rep   [x] Posterior pelvic tilt with bridge 5 sec hold x 10 reps with cues to focus on relaxation   [x] Pt struggles to isolate TrA initially but improves with practice. Tactile and verbal cues required for correct activation of TrA. Initially pt substitutes with rectus abdominus muscle but with mod verbal and tactile instruction is able to isolate out  lower abdominal muscles.      [x] Transverse abdominus muscle +Kegel+Marching 5 sec x20 reps.  With blue theraband  [x] Transverse abdominus muscle +Kegel+bent knee fall out 5 sec x20 reps. With blue theraband   Cues needed for correct TA activation and coordination with kegel.        Therapeutic Activity to improve functional performance for 8  minutes, including: Education as described below.   [x] Discharge planning   [x] Anatomy review and discussion of the anatomy on current level of function   [x] Home exercise program reviewed       Home Exercises Provided and Patient Education Provided     Education provided: See above  Discussed progression of plan of care with patient; educated pt in activity modification; reviewed HEP with pt. Pt demonstrated and verbalized understanding of all instruction and was provided with a handout of HEP (see Patient  "Instructions).    Written Home Exercises Provided: yes.  Exercises were reviewed and Swetha was able to demonstrate them prior to the end of the session.  Swetha demonstrated good  understanding of the education provided.     See EMR under Patient Instructions for exercises provided 2/10/2025     Assessment     Swetha has made excellent progress with participation in the POC towards improved pelvic floor muscle(s) function and decreased pain.  She has met the majority of her goals and is independent with her home program.  At this time she no longer requires skilled intervention and is appropriate for discharge.       Discharge reason: Patient has met all of his/her goals    Discharge FOTO Score:         Goals:  Short Term Goals: 4 weeks   Pt to perform "the knack" prior to coughing, laughing or sneezing to decrease risk of incontinence. Met   Pt to demonstrate being able to correctly and consistently perform a kegel which is needed  to increase pelvic floor muscle coordination and strength needed for continence Met   Pt to report >50% improvement in tenderness to palpation of the pelvic floor to indicate reduced myofascial dysfunction Met   Pt will report minimal to no pain with single digit pelvic assessment and display relaxation demonstrating improving pelvic floor muscle relaxation and coordination. Met   Patient to be independent with introductory home exercise program. Met     Long Term Goals: 12 weeks ,   Pt to be able to perform a 10 second kegel x 10 reps to demonstrate improving strength and endurance needed for continence.Met   Pt to demonstrate minimal/no increased tone of the pelvic floor to demonstrate improved coordination and for improved pelvic floor function and Pt to report >90% improvement in discomfort with vaginal penetration to increase tolerance for intercourse and gynecological exams, as well as indicate reduced myofascial dysfunction Met   dent with advanced home exercise program for " carryover post discharge.Met     Plan     Continue with established Plan of Care, working toward established PT goals.    At next visit: assess patient's Strength Range of Motion and balance as it relates to her plan of care.    Kathy Farnsworth, PT, DPT

## 2025-04-10 ENCOUNTER — PATIENT MESSAGE (OUTPATIENT)
Dept: ADMINISTRATIVE | Facility: OTHER | Age: 30
End: 2025-04-10
Payer: MEDICAID

## 2025-04-10 ENCOUNTER — OFFICE VISIT (OUTPATIENT)
Dept: OBSTETRICS AND GYNECOLOGY | Facility: CLINIC | Age: 30
End: 2025-04-10
Payer: MEDICAID

## 2025-04-10 VITALS
BODY MASS INDEX: 35.85 KG/M2 | DIASTOLIC BLOOD PRESSURE: 72 MMHG | WEIGHT: 202.31 LBS | SYSTOLIC BLOOD PRESSURE: 124 MMHG | HEIGHT: 63 IN | HEART RATE: 103 BPM

## 2025-04-10 DIAGNOSIS — Z80.3 FAMILY HISTORY OF BREAST CANCER IN FIRST DEGREE RELATIVE: ICD-10-CM

## 2025-04-10 DIAGNOSIS — Z72.89 OTHER PROBLEMS RELATED TO LIFESTYLE: ICD-10-CM

## 2025-04-10 DIAGNOSIS — Z30.9 ENCOUNTER FOR CONTRACEPTIVE MANAGEMENT, UNSPECIFIED TYPE: Primary | ICD-10-CM

## 2025-04-10 DIAGNOSIS — Z11.3 SCREENING EXAMINATION FOR STD (SEXUALLY TRANSMITTED DISEASE): ICD-10-CM

## 2025-04-10 LAB
B-HCG UR QL: NEGATIVE
CTP QC/QA: YES

## 2025-04-10 PROCEDURE — 99999 PR PBB SHADOW E&M-EST. PATIENT-LVL III: CPT | Mod: PBBFAC,,, | Performed by: OBSTETRICS & GYNECOLOGY

## 2025-04-10 PROCEDURE — 99999PBSHW POCT URINE PREGNANCY: Mod: PBBFAC,,,

## 2025-04-10 PROCEDURE — 99213 OFFICE O/P EST LOW 20 MIN: CPT | Mod: PBBFAC,PO | Performed by: OBSTETRICS & GYNECOLOGY

## 2025-04-10 PROCEDURE — 81025 URINE PREGNANCY TEST: CPT | Mod: PBBFAC,PO | Performed by: OBSTETRICS & GYNECOLOGY

## 2025-04-10 NOTE — PROGRESS NOTES
"Chief Complaint   Patient presents with    Annual Exam              HISTORY OF PRESENT ILLNESS:   Swetha Lomeli is a 30 y.o. female  who presents for well woman exam.  No LMP recorded (lmp unknown)..  She has no complaints.  Cycles are  irregular.  Stopped breast feeding 3 months ago, has been doing mini pill, will see spotting but hasn't has full period. Desires STD testing. Desires OCPs for birth control.      Past Medical History:   Diagnosis Date    Anxiety     Asthma     Migraine     Seizures        Past Surgical History:   Procedure Laterality Date    DILATION AND CURETTAGE OF UTERUS Bilateral 10/22/2024    Procedure: DILATION AND CURETTAGE, UTERUS;  Surgeon: Fariba James MD;  Location: [facility] L&D;  Service: OB/GYN;  Laterality: Bilateral;       Social History[1]    Family History   Problem Relation Name Age of Onset    Breast cancer Paternal Grandmother  75    Heart disease Maternal Grandfather      Colon cancer Neg Hx      Ovarian cancer Neg Hx         OB History    Para Term  AB Living   1 1  1  1   SAB IAB Ectopic Multiple Live Births      0 1      # Outcome Date GA Lbr Franck/2nd Weight Sex Type Anes PTL Lv   1  10/22/24 36w4d  2.16 kg (4 lb 12.2 oz) F Vag-Spont EPI Y QUINTIN       COMPREHENSIVE GYN HISTORY:  PAP History: h/o abnormal pap smear 2020 but normal in ,,,,  Infection History: Denies STDs. Denies PID.  Benign History:Denies uterine fibroids. Denies ovarian cysts. Denies endometriosis Denies other conditions.  Cancer History: Denies cervical cancer. Denies uterine cancer or hyperplasia. Denies ovarian cancer. Denies vulvar cancer or pre-cancer. Denies vaginal cancer or pre-cancer. Denies breast cancer. Denies colon cancer.  Cycle: nl.monthly    ROS:  Negative     /72 (Patient Position: Sitting)   Pulse 103   Ht 5' 3" (1.6 m)   Wt 91.8 kg (202 lb 4.8 oz)   LMP  (LMP Unknown)   Breastfeeding No   BMI 35.84 kg/m²     APPEARANCE: Well " nourished, well developed, in no acute distress.  .  ABDOMEN: Soft. No tenderness or masses. No hernias. No hepatosplenomegaly.  BREASTS: Symmetrical, no skin changes or visible lesions. No palpable masses, nipple discharge or adenopathy bilaterally.  PELVIC:   VULVA: No lesions. Normal female genitalia.  URETHRAL MEATUS: Normal size and location, no lesions, no prolapse.  URETHRA: No masses, tenderness, prolapse or scarring.  VAGINA: Moist and well rugated, no discharge, no significant cystocele or rectocele.  CERVIX: No lesions and discharge.  UTERUS: Normal size, regular shape, mobile, non-tender, bladder base nontender.  ADNEXA: No masses or tenderness.        1. Encounter for contraceptive management, unspecified type    2. Family history of breast cancer in first degree relative    3. Screening examination for STD (sexually transmitted disease)    4. Other problems related to lifestyle        Plan:  Routine gyn s/p normal breast exam. Pap  up to date, due next year with HPV  . STD testing: GC/CT did. Counseled on contraception and desires  OCPs.  2. Mom with breast cancer and several members on dad's side with breast cancer, interested in gene screening. Referral to don polo       F/u in 1 yr or PRN             [1]   Social History  Socioeconomic History    Marital status: Significant Other   Tobacco Use    Smoking status: Former    Smokeless tobacco: Never   Substance and Sexual Activity    Alcohol use: Yes     Comment: occasional     Drug use: No    Sexual activity: Not Currently     Partners: Male     Birth control/protection: OCP     Social Drivers of Health     Financial Resource Strain: Low Risk  (9/17/2024)    Overall Financial Resource Strain (CARDIA)     Difficulty of Paying Living Expenses: Not very hard   Food Insecurity: No Food Insecurity (9/17/2024)    Hunger Vital Sign     Worried About Running Out of Food in the Last Year: Never true     Ran Out of Food in the Last Year: Never true    Transportation Needs: No Transportation Needs (9/17/2024)    TRANSPORTATION NEEDS     Transportation : No   Stress: No Stress Concern Present (9/17/2024)    [employer] - Occupational Stress Questionnaire     Feeling of Stress : Only a little   Housing Stability: Unknown (9/17/2024)    Housing Stability Vital Sign     Unable to Pay for Housing in the Last Year: No     Homeless in the Last Year: No

## 2025-04-15 ENCOUNTER — PATIENT MESSAGE (OUTPATIENT)
Dept: ADMINISTRATIVE | Facility: OTHER | Age: 30
End: 2025-04-15
Payer: MEDICAID

## 2025-04-17 ENCOUNTER — PATIENT MESSAGE (OUTPATIENT)
Dept: OBSTETRICS AND GYNECOLOGY | Facility: HOSPITAL | Age: 30
End: 2025-04-17
Payer: MEDICAID

## 2025-04-28 ENCOUNTER — TELEPHONE (OUTPATIENT)
Dept: OBSTETRICS AND GYNECOLOGY | Facility: CLINIC | Age: 30
End: 2025-04-28
Payer: MEDICAID

## 2025-04-28 RX ORDER — NORGESTIMATE AND ETHINYL ESTRADIOL 0.25-0.035
1 KIT ORAL DAILY
Qty: 84 TABLET | Refills: 3 | Status: SHIPPED | OUTPATIENT
Start: 2025-04-28 | End: 2026-04-28

## 2025-04-28 NOTE — TELEPHONE ENCOUNTER
Ok. I sent in to her pharmacy. Should check her blood pressure a few times after starting it to make sure stays normal.

## 2025-04-28 NOTE — TELEPHONE ENCOUNTER
----- Message from Bijan Horn sent at 4/25/2025  4:54 PM CDT -----    ----- Message -----  From: Luz Mace  Sent: 4/24/2025  12:18 PM CDT  To: [facility] Link Staff    Type: Patient Call BackWho called:pt What is the request in detail:pt requesting to discuss getting Jessica birth control. Not in medication list. Call pt Can the clinic reply by [facility]?Would the patient rather a call back or a response via [facility]? call[facility] call back number:[phone] (home) Additional Information: